# Patient Record
Sex: FEMALE | Race: WHITE | Employment: OTHER | URBAN - METROPOLITAN AREA
[De-identification: names, ages, dates, MRNs, and addresses within clinical notes are randomized per-mention and may not be internally consistent; named-entity substitution may affect disease eponyms.]

---

## 2017-02-19 RX ORDER — PRAVASTATIN SODIUM 20 MG
TABLET ORAL
Qty: 90 TABLET | Refills: 1 | Status: SHIPPED | OUTPATIENT
Start: 2017-02-19 | End: 2017-06-14

## 2017-02-19 RX ORDER — LEVOTHYROXINE SODIUM 0.07 MG/1
TABLET ORAL
Qty: 90 TABLET | Refills: 1 | Status: SHIPPED | OUTPATIENT
Start: 2017-02-19 | End: 2017-06-15

## 2017-02-19 RX ORDER — TOPIRAMATE 100 MG/1
TABLET, FILM COATED ORAL
Qty: 90 TABLET | Refills: 1 | Status: SHIPPED | OUTPATIENT
Start: 2017-02-19 | End: 2017-06-15

## 2017-05-25 PROBLEM — M85.80 OSTEOPENIA: Status: ACTIVE | Noted: 2017-05-25

## 2017-05-31 ENCOUNTER — TELEPHONE (OUTPATIENT)
Dept: FAMILY MEDICINE CLINIC | Facility: CLINIC | Age: 82
End: 2017-05-31

## 2017-05-31 NOTE — TELEPHONE ENCOUNTER
Dr Levy Roe, Please advise on request for labs to be done prior to Silas Reyes Annual appt on 6/2/17. Thank you.

## 2017-06-02 ENCOUNTER — OFFICE VISIT (OUTPATIENT)
Dept: INTERNAL MEDICINE CLINIC | Facility: CLINIC | Age: 82
End: 2017-06-02

## 2017-06-02 VITALS
BODY MASS INDEX: 23.39 KG/M2 | HEIGHT: 64 IN | HEART RATE: 73 BPM | DIASTOLIC BLOOD PRESSURE: 69 MMHG | SYSTOLIC BLOOD PRESSURE: 112 MMHG | RESPIRATION RATE: 16 BRPM | TEMPERATURE: 98 F | WEIGHT: 137 LBS

## 2017-06-02 DIAGNOSIS — E03.9 HYPOTHYROIDISM, UNSPECIFIED TYPE: ICD-10-CM

## 2017-06-02 DIAGNOSIS — H02.88B MEIBOMIAN GLAND DYSFUNCTION (MGD), BILATERAL, BOTH UPPER AND LOWER LIDS: ICD-10-CM

## 2017-06-02 DIAGNOSIS — M85.80 OSTEOPENIA WITH HIGH RISK OF FRACTURE: ICD-10-CM

## 2017-06-02 DIAGNOSIS — H04.123 DRY EYES, BILATERAL: ICD-10-CM

## 2017-06-02 DIAGNOSIS — N60.19 FIBROCYSTIC BREAST, UNSPECIFIED LATERALITY: ICD-10-CM

## 2017-06-02 DIAGNOSIS — H02.88A MEIBOMIAN GLAND DYSFUNCTION (MGD), BILATERAL, BOTH UPPER AND LOWER LIDS: ICD-10-CM

## 2017-06-02 DIAGNOSIS — G43.909 MIGRAINE WITHOUT STATUS MIGRAINOSUS, NOT INTRACTABLE, UNSPECIFIED MIGRAINE TYPE: ICD-10-CM

## 2017-06-02 DIAGNOSIS — E78.5 HYPERLIPIDEMIA, UNSPECIFIED HYPERLIPIDEMIA TYPE: ICD-10-CM

## 2017-06-02 DIAGNOSIS — Z00.00 ENCOUNTER FOR MEDICARE ANNUAL WELLNESS EXAM: Primary | ICD-10-CM

## 2017-06-02 DIAGNOSIS — M85.89 OSTEOPENIA OF MULTIPLE SITES: ICD-10-CM

## 2017-06-02 DIAGNOSIS — Z85.3 HISTORY OF RIGHT BREAST CANCER: ICD-10-CM

## 2017-06-02 PROCEDURE — G0463 HOSPITAL OUTPT CLINIC VISIT: HCPCS | Performed by: INTERNAL MEDICINE

## 2017-06-02 PROCEDURE — G0439 PPPS, SUBSEQ VISIT: HCPCS | Performed by: INTERNAL MEDICINE

## 2017-06-02 PROCEDURE — 96160 PT-FOCUSED HLTH RISK ASSMT: CPT | Performed by: INTERNAL MEDICINE

## 2017-06-02 PROCEDURE — 99213 OFFICE O/P EST LOW 20 MIN: CPT | Performed by: INTERNAL MEDICINE

## 2017-06-02 NOTE — PROGRESS NOTES
HPI:    Patient ID: Rima Rodriguez is a 80year old female.     HPI    Medicare annual exam and follow up of chronic medical problesm incuding but not limtied to  Hyperlipidemia     sympotms  :        Headache occasional migrain  dizziness        no mouth  daily Disp: 90 tablet Rfl: 1   PRAVASTATIN SODIUM 20 MG Oral Tab Take 1 tablet by mouth  nightly Disp: 90 tablet Rfl: 1   Ketoconazole 2 % External Shampoo Apply to scalp 2 times per week.  Disp: 120 mL Rfl: 6   Halcinonide (HALOG) 0.1 % External Cre respiratory distress. She has no wheezes. She has no rales. She exhibits no tenderness. Abdominal: Soft. Bowel sounds are normal. She exhibits no distension and no mass. There is no hepatosplenomegaly. There is no tenderness. No hernia.    Musculoskeletal URINE WBC      0 - 5 /HPF   7 (H)   URINE RBC      0 - 3 /HPF   <1   URINE BACTERIA      None seen   Negative   MICROSCOPIC URINALYSIS COMMENT         Completed   WBC      4.0 - 11.0 K/UL      RBC      3.70 - 5.40 M/UL      Hemoglobin      12.0 - 16.0 G/ Negative mg/dL  Negative   KETONES, URINE      Negative mg/dL  Trace (A)   BILIRUBIN, URINE, QUAL.       Negative  Negative   BLOOD, URINE      Negative UL  Negative   URINE NITRITE      Negative  Negative   UROBILINOGEN, URINE      <2.0 mg/dL  <2.0   URINE 1.6     PA LUMBAR SPINE (L1 - L4)       BMD:  0.972 gm/sq. cm.    T SCORE: -0.7      Z SCORE: 2.1  CONCLUSION: Moderate osteopenia left femoral neck. Normal bone density  total left hip and lumbar spine. 22% 10 year risk for major osteoporotic  fracture.  6 effect reviewed. Most recent laboratory and diagnostic testing reviewed. Dietary and lifestyle change discussed. Modification of risk for CAD discussed. Patient voiced understanding and agrees with current plan and management.     HPI:   Bishop Caballero lose urine?: 0-No    Do you have difficulty seeing?: 0-No    Do you have any difficulty walking or getting up?: 0-No    Do you have any tripping hazards?: 0-No    Are you on multiple medications?: 1-Yes    Does pain affect your day to day activities?:  0-No triamcinolone acetonide 0.025 % External Cream Apply topically 2 (two) times daily.  Disp:  Rfl:       MEDICAL INFORMATION:   Past Medical History   Diagnosis Date   • Migraine    • Hyperlipidemia           Past Surgical History    HX BREAST CANCER Right LDL Annually LDL CHOLESTEROL (mg/dL)   Date Value   04/23/2016 169*        EKG One Time completed    Colorectal Cancer Screening      Colonoscopy Screen every 10 years Colonoscopy,10 Years due on 04/16/1983 Update Health Maintenance if applicable    Fl Annually CREATININE (P) (mg/dL)   Date Value   04/23/2016 0.63    No flowsheet data found. BUN  Annually BUN (mg/dL)   Date Value   04/23/2016 11    No flowsheet data found.      Drug Serum Conc  Annually No results found for: DIGOXIN, DIG, VALP No flow 12/13/2016      Pneumococcal (Prevnar 13)                          04/22/2016            Meds This Visit:  No prescriptions requested or ordered in this encounter    Imaging & Referrals:  None        YV#[id number]

## 2017-06-05 ENCOUNTER — APPOINTMENT (OUTPATIENT)
Dept: LAB | Age: 82
End: 2017-06-05
Attending: INTERNAL MEDICINE
Payer: MEDICARE

## 2017-06-05 DIAGNOSIS — E78.5 HYPERLIPIDEMIA, UNSPECIFIED HYPERLIPIDEMIA TYPE: ICD-10-CM

## 2017-06-05 DIAGNOSIS — E03.9 HYPOTHYROIDISM, UNSPECIFIED TYPE: ICD-10-CM

## 2017-06-05 DIAGNOSIS — M85.89 OSTEOPENIA OF MULTIPLE SITES: ICD-10-CM

## 2017-06-05 PROCEDURE — 80061 LIPID PANEL: CPT

## 2017-06-05 PROCEDURE — 84443 ASSAY THYROID STIM HORMONE: CPT

## 2017-06-05 PROCEDURE — 83970 ASSAY OF PARATHORMONE: CPT

## 2017-06-05 PROCEDURE — 81001 URINALYSIS AUTO W/SCOPE: CPT

## 2017-06-05 PROCEDURE — 36415 COLL VENOUS BLD VENIPUNCTURE: CPT

## 2017-06-05 PROCEDURE — 84439 ASSAY OF FREE THYROXINE: CPT

## 2017-06-05 PROCEDURE — 80053 COMPREHEN METABOLIC PANEL: CPT

## 2017-06-05 PROCEDURE — 84080 ASSAY ALKALINE PHOSPHATASES: CPT

## 2017-06-05 PROCEDURE — 82306 VITAMIN D 25 HYDROXY: CPT

## 2017-06-05 PROCEDURE — 85027 COMPLETE CBC AUTOMATED: CPT

## 2017-06-09 ENCOUNTER — TELEPHONE (OUTPATIENT)
Dept: INTERNAL MEDICINE CLINIC | Facility: CLINIC | Age: 82
End: 2017-06-09

## 2017-06-09 DIAGNOSIS — R30.0 DYSURIA: Primary | ICD-10-CM

## 2017-06-14 NOTE — TELEPHONE ENCOUNTER
Spoke to pt, she is agreeable to UA and culture. She will go to the lab on Friday. Advised drinking adequate water every day. Pt voices understanding and agrees with plan.

## 2017-06-14 NOTE — TELEPHONE ENCOUNTER
Vit D is slightly low  urinalsyis slightly abnormal No RX no symptoms  Lipids elevated  Blood sugar liver kidney normal    CALL patient   Send her copy of result      Component      Latest Ref Rng 6/5/2017   Glucose      70-99 mg/dL 96   Sodium      136-14 140-400 K/   MEAN PLATELET VOLUME      7.4-10.3 fL 8.2   HDL Cholesterol       75   CHOLESTEROL, TOTAL      110-200 mg/dL 252 (H)   Triglycerides      1-149 mg/dL 83   NON HDL CHOL      <130 mg/dL 177 (H)   LDL Cholesterol Calc      0-99 mg/dL 160

## 2017-06-14 NOTE — TELEPHONE ENCOUNTER
Order Ua and culture  Drink adequate amount of water 64 oz per day  Will treat accordingly based on culture  If this is not ok with her let me know    I dont want to give her meds  Without testing    anitbiotic have potential side effect and she is nemesio

## 2017-06-16 ENCOUNTER — APPOINTMENT (OUTPATIENT)
Dept: LAB | Age: 82
End: 2017-06-16
Attending: INTERNAL MEDICINE
Payer: MEDICARE

## 2017-06-16 DIAGNOSIS — R30.0 DYSURIA: ICD-10-CM

## 2017-06-16 PROCEDURE — 87086 URINE CULTURE/COLONY COUNT: CPT

## 2017-06-16 PROCEDURE — 81001 URINALYSIS AUTO W/SCOPE: CPT

## 2017-06-16 RX ORDER — TOPIRAMATE 100 MG/1
TABLET, FILM COATED ORAL
Qty: 90 TABLET | Refills: 3 | Status: SHIPPED | OUTPATIENT
Start: 2017-06-16 | End: 2018-07-14

## 2017-06-16 RX ORDER — LEVOTHYROXINE SODIUM 0.07 MG/1
TABLET ORAL
Qty: 90 TABLET | Refills: 3 | Status: SHIPPED | OUTPATIENT
Start: 2017-06-16 | End: 2018-08-17 | Stop reason: DRUGHIGH

## 2017-07-27 ENCOUNTER — TELEPHONE (OUTPATIENT)
Dept: INTERNAL MEDICINE CLINIC | Facility: CLINIC | Age: 82
End: 2017-07-27

## 2017-07-27 NOTE — TELEPHONE ENCOUNTER
Actions Requested: appointment  Problem: dry cough and tightness in chest  Onset and Timing: 3 days  Associated Symptoms: Pt stts has dry cough and tightness in chest. Denies chest pain or shortness of breath. No fever or nasal congestion.  Pt stated a nurs or a weak immune system (e.g., HIV positive, cancer chemotherapy, organ transplant, splenectomy, chronic steroids)  * Fever > 100.5 F (38.1 C) and bedridden (e.g., nursing home patient, stroke, chronic illness, recovering from surgery)  * Increasing ankle

## 2017-07-28 ENCOUNTER — OFFICE VISIT (OUTPATIENT)
Dept: INTERNAL MEDICINE CLINIC | Facility: CLINIC | Age: 82
End: 2017-07-28

## 2017-07-28 VITALS
SYSTOLIC BLOOD PRESSURE: 108 MMHG | DIASTOLIC BLOOD PRESSURE: 70 MMHG | WEIGHT: 137 LBS | HEIGHT: 64 IN | TEMPERATURE: 98 F | HEART RATE: 81 BPM | BODY MASS INDEX: 23.39 KG/M2

## 2017-07-28 DIAGNOSIS — Z91.81 AT HIGH RISK FOR FALLS: ICD-10-CM

## 2017-07-28 DIAGNOSIS — J40 BRONCHITIS: Primary | ICD-10-CM

## 2017-07-28 DIAGNOSIS — R26.9 GAIT ABNORMALITY: ICD-10-CM

## 2017-07-28 PROCEDURE — 99214 OFFICE O/P EST MOD 30 MIN: CPT | Performed by: INTERNAL MEDICINE

## 2017-07-28 PROCEDURE — G0463 HOSPITAL OUTPT CLINIC VISIT: HCPCS | Performed by: INTERNAL MEDICINE

## 2017-07-28 RX ORDER — CLARITHROMYCIN 500 MG/1
500 TABLET, COATED ORAL 2 TIMES DAILY
Qty: 20 TABLET | Refills: 0 | Status: SHIPPED | OUTPATIENT
Start: 2017-07-28 | End: 2017-08-11

## 2017-07-28 RX ORDER — CODEINE PHOSPHATE AND GUAIFENESIN 10; 100 MG/5ML; MG/5ML
5 SOLUTION ORAL EVERY 6 HOURS PRN
Qty: 180 ML | Refills: 0 | Status: SHIPPED | OUTPATIENT
Start: 2017-07-28 | End: 2018-08-17 | Stop reason: ALTCHOICE

## 2017-07-28 NOTE — TELEPHONE ENCOUNTER
Advised patient of Dr. Umm Caba note. Patient verbalized understanding. Appointment booked for tomorrow at 1pm with Dr Iona Serrano at [facility].

## 2017-07-28 NOTE — PROGRESS NOTES
HPI:    Patient ID: Holli Kc is a 80year old female.     HPI    No fever   Has chills   Has discolored phlegm    Feels very tired   Not short of breath  Not wheezing   No leg swelling   No PND    Normally sleeps with 3 or 4 pillows   Denies shortness 650 g Rfl: 3   TraMADol HCl 50 MG Oral Tab TAKE 1 TABLET PO EVERY 4 TO 6 HOURS PRN FOR PAIN WITH FOOD Disp:  Rfl: 0   triamcinolone acetonide 0.025 % External Cream Apply topically 2 (two) times daily.  Disp:  Rfl:      Allergies:No Known Allergies    HISTO adenopathy. Neurological: She is alert. Skin: No rash noted. She is not diaphoretic. No erythema. Nursing note and vitals reviewed.            ASSESSMENT/PLAN:   (J40) Bronchitis  (primary encounter diagnosis)  Plan: XR CHEST PA + LAT CHEST (CPT=71020

## 2017-08-11 ENCOUNTER — TELEPHONE (OUTPATIENT)
Dept: INTERNAL MEDICINE CLINIC | Facility: CLINIC | Age: 82
End: 2017-08-11

## 2017-08-11 ENCOUNTER — OFFICE VISIT (OUTPATIENT)
Dept: INTERNAL MEDICINE CLINIC | Facility: CLINIC | Age: 82
End: 2017-08-11

## 2017-08-11 VITALS
HEIGHT: 64 IN | BODY MASS INDEX: 23.39 KG/M2 | TEMPERATURE: 98 F | SYSTOLIC BLOOD PRESSURE: 111 MMHG | DIASTOLIC BLOOD PRESSURE: 74 MMHG | WEIGHT: 137 LBS | HEART RATE: 86 BPM

## 2017-08-11 DIAGNOSIS — M72.2 PLANTAR FASCIITIS OF LEFT FOOT: Primary | ICD-10-CM

## 2017-08-11 PROCEDURE — G0463 HOSPITAL OUTPT CLINIC VISIT: HCPCS | Performed by: INTERNAL MEDICINE

## 2017-08-11 PROCEDURE — 99213 OFFICE O/P EST LOW 20 MIN: CPT | Performed by: INTERNAL MEDICINE

## 2017-08-11 RX ORDER — METHYLPREDNISOLONE 4 MG/1
TABLET ORAL
Qty: 1 KIT | Refills: 0 | Status: SHIPPED | OUTPATIENT
Start: 2017-08-11 | End: 2018-08-02 | Stop reason: ALTCHOICE

## 2017-08-11 NOTE — PROGRESS NOTES
Patient ID: Babak Kimbrough is a 80year old female. Patient presents with: Foot Pain: L foot pain x3 days       HISTORY OF PRESENT ILLNESS:   HPI  Patient presents for above. Having left sole pain for the past 2 days.   Having a difficult time standing History  Social History   Marital status:    Spouse name: N/A    Years of education: N/A  Number of children: N/A     Occupational History  None on file     Social History Main Topics   Smoking status: Never Smoker    Smokeless tobacco: Never Used

## 2017-08-11 NOTE — PATIENT INSTRUCTIONS
Plantar Fasciitis  Plantar fasciitis is a painful swelling of the plantar fascia. The plantar fascia is a thick, fibrous layer of tissue. It covers the bones on the bottom of your foot. And it supports the foot bones in an arched position.   This can happ · First thing in the morning and before sports, stretch the bottom of your feet. Gently flex your ankle so the toes move toward your knee. · Icing may help control heel pain. Apply an ice pack to the heel for 10-20 minutes as a preventive.  Or ice your gibran

## 2017-08-11 NOTE — TELEPHONE ENCOUNTER
Actions Requested: Appointment made for today at 2:20pm with Dr Darlene Rocha at [facility]  Problem: left foot pain  Onset and Timing: Since 8/9/17  Associated Symptoms: ankle discomfort  Aggravating by: walking  Alleviated by: not walking  Triage Note: patient sta

## 2017-08-18 ENCOUNTER — TELEPHONE (OUTPATIENT)
Dept: INTERNAL MEDICINE CLINIC | Facility: CLINIC | Age: 82
End: 2017-08-18

## 2017-08-18 NOTE — TELEPHONE ENCOUNTER
Patient reports that her insurance will not cover another DEXA screening in 2017 because one had been performed last year.  She is requesting the diagnosis/result from the one performed in 2016:    Notes Recorded by Joss Hurtado MD on 6/15/2016 at 1:22

## 2017-08-18 NOTE — TELEPHONE ENCOUNTER
Patient states her insurance will only cover a DEXA scan every 3 years. Last scan on 6/8/16, results below. Please advise if appeal is needed.

## 2017-08-24 ENCOUNTER — TELEPHONE (OUTPATIENT)
Dept: INTERNAL MEDICINE CLINIC | Facility: CLINIC | Age: 82
End: 2017-08-24

## 2017-08-24 RX ORDER — PRAVASTATIN SODIUM 20 MG
TABLET ORAL
Qty: 90 TABLET | Refills: 3 | Status: SHIPPED | OUTPATIENT
Start: 2017-08-24 | End: 2017-12-27

## 2017-08-25 ENCOUNTER — OFFICE VISIT (OUTPATIENT)
Dept: INTERNAL MEDICINE CLINIC | Facility: CLINIC | Age: 82
End: 2017-08-25

## 2017-08-25 VITALS
HEIGHT: 64 IN | SYSTOLIC BLOOD PRESSURE: 108 MMHG | BODY MASS INDEX: 23.39 KG/M2 | DIASTOLIC BLOOD PRESSURE: 72 MMHG | WEIGHT: 137 LBS | HEART RATE: 76 BPM | TEMPERATURE: 97 F

## 2017-08-25 DIAGNOSIS — M72.2 PLANTAR FASCIITIS OF LEFT FOOT: Primary | ICD-10-CM

## 2017-08-25 PROCEDURE — G0463 HOSPITAL OUTPT CLINIC VISIT: HCPCS | Performed by: INTERNAL MEDICINE

## 2017-08-25 PROCEDURE — 99213 OFFICE O/P EST LOW 20 MIN: CPT | Performed by: INTERNAL MEDICINE

## 2017-08-25 NOTE — PROGRESS NOTES
Patient ID: Elizabeth Ocampo is a 80year old female. Patient presents with: Follow - Up: L knee pain, steroids have helped       HISTORY OF PRESENT ILLNESS:   HPI  Patient presents for above. Here for follow-up of left foot plantar fasciitis.   Took her tablet, Rfl: 3    Allergies:No Known Allergies      Social History  Social History   Marital status:    Spouse name: N/A    Years of education: N/A  Number of children: N/A     Occupational History  None on file     Social History Main Topics   Hui

## 2017-10-16 ENCOUNTER — TELEPHONE (OUTPATIENT)
Dept: OPHTHALMOLOGY | Facility: CLINIC | Age: 82
End: 2017-10-16

## 2017-10-16 NOTE — TELEPHONE ENCOUNTER
Pt. having alot of dryness and burning and slight redness on the outside of the eye and not able to sleep at night. Pt.requesting to be seen sooner then 1st avail.

## 2017-10-19 ENCOUNTER — OFFICE VISIT (OUTPATIENT)
Dept: OPHTHALMOLOGY | Facility: CLINIC | Age: 82
End: 2017-10-19

## 2017-10-19 DIAGNOSIS — H25.12 AGE-RELATED NUCLEAR CATARACT OF LEFT EYE: ICD-10-CM

## 2017-10-19 DIAGNOSIS — H04.123 DRY EYES, BILATERAL: Primary | ICD-10-CM

## 2017-10-19 DIAGNOSIS — Z96.1 PSEUDOPHAKIA, RIGHT EYE: ICD-10-CM

## 2017-10-19 PROCEDURE — 92014 COMPRE OPH EXAM EST PT 1/>: CPT | Performed by: OPHTHALMOLOGY

## 2017-10-19 PROCEDURE — 92015 DETERMINE REFRACTIVE STATE: CPT | Performed by: OPHTHALMOLOGY

## 2017-10-19 NOTE — PROGRESS NOTES
Rima Rodriguez is a 80year old female. HPI:     HPI     Pt is here for a complete exam.  Pt is complaining of dry eyes OU  and states that her eyelids are itchy at night.   Pt is using Systane gtts  OU 3-4 times a day and is using Systane Gel gtts on he 650 g Rfl: 3   TraMADol HCl 50 MG Oral Tab TAKE 1 TABLET PO EVERY 4 TO 6 HOURS PRN FOR PAIN WITH FOOD Disp:  Rfl: 0   triamcinolone acetonide 0.025 % External Cream Apply topically 2 (two) times daily.  Disp:  Rfl:        Allergies:  No Known Allergies    R Type:  Progressive bifocal                 ASSESSMENT/PLAN:     Diagnoses and Plan:     Dry eyes, bilateral  Patient was instructed to use warm compresses to the eyelids twice a day everyday.     Instructions for warm compress use:   Patient should place wa

## 2017-10-19 NOTE — PATIENT INSTRUCTIONS
Dry eyes, bilateral  Patient was instructed to use warm compresses to the eyelids twice a day everyday. Instructions for warm compress use:   Patient should place wash compresses on both eyelids for 5 minutes every morning and every night.   After 5 jamari

## 2017-10-19 NOTE — ASSESSMENT & PLAN NOTE
Discussed diagnosis of cataracts in detail with patient. Cataract surgery could be considered in the left eye at this time due to decreased vision, but it would be patient's choice. Patient is not interested in surgery at this time.     Will continue to

## 2017-12-21 ENCOUNTER — TELEPHONE (OUTPATIENT)
Dept: INTERNAL MEDICINE CLINIC | Facility: CLINIC | Age: 82
End: 2017-12-21

## 2017-12-21 NOTE — TELEPHONE ENCOUNTER
Pt is calling to request orders for a cholesterol test. pls advise . Thank you  Pt is requesting a call back when approved.

## 2017-12-27 ENCOUNTER — APPOINTMENT (OUTPATIENT)
Dept: LAB | Age: 82
End: 2017-12-27
Attending: INTERNAL MEDICINE
Payer: MEDICARE

## 2017-12-27 DIAGNOSIS — E78.5 HYPERLIPIDEMIA, UNSPECIFIED HYPERLIPIDEMIA TYPE: ICD-10-CM

## 2017-12-27 PROCEDURE — 80061 LIPID PANEL: CPT

## 2017-12-27 PROCEDURE — 36415 COLL VENOUS BLD VENIPUNCTURE: CPT

## 2017-12-28 RX ORDER — KETOCONAZOLE 20 MG/ML
SHAMPOO TOPICAL
Qty: 120 ML | Refills: 2 | Status: SHIPPED | OUTPATIENT
Start: 2017-12-28 | End: 2018-08-17 | Stop reason: ALTCHOICE

## 2018-01-11 ENCOUNTER — TELEPHONE (OUTPATIENT)
Dept: INTERNAL MEDICINE CLINIC | Facility: CLINIC | Age: 83
End: 2018-01-11

## 2018-06-25 ENCOUNTER — TELEPHONE (OUTPATIENT)
Dept: INTERNAL MEDICINE CLINIC | Facility: CLINIC | Age: 83
End: 2018-06-25

## 2018-06-29 ENCOUNTER — APPOINTMENT (OUTPATIENT)
Dept: LAB | Age: 83
End: 2018-06-29
Attending: INTERNAL MEDICINE
Payer: MEDICARE

## 2018-06-29 DIAGNOSIS — E03.9 HYPOTHYROIDISM, UNSPECIFIED TYPE: ICD-10-CM

## 2018-06-29 DIAGNOSIS — E78.5 HYPERLIPIDEMIA, UNSPECIFIED HYPERLIPIDEMIA TYPE: ICD-10-CM

## 2018-06-29 LAB
ALBUMIN SERPL BCP-MCNC: 3.6 G/DL (ref 3.5–4.8)
ALBUMIN/GLOB SERPL: 1.2 {RATIO} (ref 1–2)
ALP SERPL-CCNC: 80 U/L (ref 32–100)
ALT SERPL-CCNC: 16 U/L (ref 14–54)
ANION GAP SERPL CALC-SCNC: 8 MMOL/L (ref 0–18)
AST SERPL-CCNC: 19 U/L (ref 15–41)
BILIRUB SERPL-MCNC: 0.6 MG/DL (ref 0.3–1.2)
BUN SERPL-MCNC: 16 MG/DL (ref 8–20)
BUN/CREAT SERPL: 22.9 (ref 10–20)
CALCIUM SERPL-MCNC: 8.9 MG/DL (ref 8.5–10.5)
CHLORIDE SERPL-SCNC: 107 MMOL/L (ref 95–110)
CHOLEST SERPL-MCNC: 223 MG/DL (ref 110–200)
CO2 SERPL-SCNC: 24 MMOL/L (ref 22–32)
CREAT SERPL-MCNC: 0.7 MG/DL (ref 0.5–1.5)
ERYTHROCYTE [DISTWIDTH] IN BLOOD BY AUTOMATED COUNT: 13.8 % (ref 11–15)
GLOBULIN PLAS-MCNC: 2.9 G/DL (ref 2.5–3.7)
GLUCOSE SERPL-MCNC: 90 MG/DL (ref 70–99)
HCT VFR BLD AUTO: 39.6 % (ref 35–48)
HDLC SERPL-MCNC: 66 MG/DL
HGB BLD-MCNC: 13.3 G/DL (ref 12–16)
LDLC SERPL CALC-MCNC: 137 MG/DL (ref 0–99)
MCH RBC QN AUTO: 29.5 PG (ref 27–32)
MCHC RBC AUTO-ENTMCNC: 33.5 G/DL (ref 32–37)
MCV RBC AUTO: 88 FL (ref 80–100)
NONHDLC SERPL-MCNC: 157 MG/DL
OSMOLALITY UR CALC.SUM OF ELEC: 289 MOSM/KG (ref 275–295)
PATIENT FASTING: YES
PLATELET # BLD AUTO: 182 K/UL (ref 140–400)
PMV BLD AUTO: 8.3 FL (ref 7.4–10.3)
POTASSIUM SERPL-SCNC: 3.9 MMOL/L (ref 3.3–5.1)
PROT SERPL-MCNC: 6.5 G/DL (ref 5.9–8.4)
RBC # BLD AUTO: 4.5 M/UL (ref 3.7–5.4)
SODIUM SERPL-SCNC: 139 MMOL/L (ref 136–144)
T4 FREE SERPL-MCNC: 1.28 NG/DL (ref 0.58–1.64)
TRIGL SERPL-MCNC: 102 MG/DL (ref 1–149)
TSH SERPL-ACNC: 0.25 UIU/ML (ref 0.45–5.33)
WBC # BLD AUTO: 3.4 K/UL (ref 4–11)

## 2018-06-29 PROCEDURE — 80061 LIPID PANEL: CPT

## 2018-06-29 PROCEDURE — 80053 COMPREHEN METABOLIC PANEL: CPT

## 2018-06-29 PROCEDURE — 84443 ASSAY THYROID STIM HORMONE: CPT

## 2018-06-29 PROCEDURE — 85027 COMPLETE CBC AUTOMATED: CPT

## 2018-06-29 PROCEDURE — 36415 COLL VENOUS BLD VENIPUNCTURE: CPT

## 2018-06-29 PROCEDURE — 84439 ASSAY OF FREE THYROXINE: CPT

## 2018-07-16 RX ORDER — TOPIRAMATE 100 MG/1
TABLET, FILM COATED ORAL
Qty: 90 TABLET | Refills: 0 | Status: SHIPPED | OUTPATIENT
Start: 2018-07-16 | End: 2018-09-03

## 2018-08-02 ENCOUNTER — OFFICE VISIT (OUTPATIENT)
Dept: NEUROLOGY | Facility: CLINIC | Age: 83
End: 2018-08-02
Payer: COMMERCIAL

## 2018-08-02 ENCOUNTER — HOSPITAL ENCOUNTER (OUTPATIENT)
Dept: GENERAL RADIOLOGY | Facility: HOSPITAL | Age: 83
Discharge: HOME | End: 2018-08-02
Attending: Other
Payer: MEDICARE

## 2018-08-02 ENCOUNTER — TELEPHONE (OUTPATIENT)
Dept: NEUROLOGY | Facility: CLINIC | Age: 83
End: 2018-08-02

## 2018-08-02 VITALS
DIASTOLIC BLOOD PRESSURE: 64 MMHG | BODY MASS INDEX: 23.39 KG/M2 | WEIGHT: 137 LBS | HEART RATE: 82 BPM | HEIGHT: 64 IN | SYSTOLIC BLOOD PRESSURE: 110 MMHG

## 2018-08-02 DIAGNOSIS — M79.601 RIGHT ARM PAIN: Primary | ICD-10-CM

## 2018-08-02 DIAGNOSIS — M79.601 RIGHT ARM PAIN: ICD-10-CM

## 2018-08-02 PROCEDURE — 99204 OFFICE O/P NEW MOD 45 MIN: CPT | Performed by: OTHER

## 2018-08-02 PROCEDURE — 72050 X-RAY EXAM NECK SPINE 4/5VWS: CPT | Performed by: OTHER

## 2018-08-02 RX ORDER — GABAPENTIN 100 MG/1
CAPSULE ORAL
Qty: 90 CAPSULE | Refills: 1 | Status: SHIPPED | OUTPATIENT
Start: 2018-08-02 | End: 2019-06-14

## 2018-08-02 RX ORDER — TRAMADOL HYDROCHLORIDE 50 MG/1
50 TABLET ORAL EVERY 6 HOURS PRN
Qty: 25 TABLET | Refills: 0 | Status: SHIPPED | OUTPATIENT
Start: 2018-08-02 | End: 2019-06-14

## 2018-08-02 NOTE — PROGRESS NOTES
Neurology Outpatient Consult Note    Nasrin Ferrer : 1933   Referring Physician: Dr. Vishal Paris   HPI:     Nasrin Ferrer is a 80year old female who is being seen in neurologic evaluation. Patient being seen in evaluation for right arm pain.   She Surgical History:  2014: CATARACT EXTRACTION W/  INTRAOCULAR LENS IMPLA* Right      Comment: [facility] Kelin Small [facility]  No date: HX BREAST CANCER Right   Family History   Problem Relation Age of Onset   • Asthma Father    • Cancer Mother    • Diabetes and reactive to light; extraocular movements intact; facial sensation intact V1-3, face symmetric, hearing intact, no dysarthria or dysphonia  Motor: 5/5 strength proximally and distally with the exception of minimal weakness in thumb opposition on the rig

## 2018-08-03 NOTE — TELEPHONE ENCOUNTER
If we can please call pt and advise she did have arthritic changes in her cervical spine at multiple levels on X-ray, most pronounced at C5/6, as expected. This can certainly be causing her right arm symptoms.   She may benefit from course of PT, will plac

## 2018-08-03 NOTE — TELEPHONE ENCOUNTER
Spoke to patient and notified her of below. She was understanding and would like to move forward with PT. Gave patient phone number to [facility] at [facility]. She will call and schedule.

## 2018-08-07 ENCOUNTER — TELEPHONE (OUTPATIENT)
Dept: PHYSICAL THERAPY | Age: 83
End: 2018-08-07

## 2018-08-17 ENCOUNTER — OFFICE VISIT (OUTPATIENT)
Dept: INTERNAL MEDICINE CLINIC | Facility: CLINIC | Age: 83
End: 2018-08-17
Payer: COMMERCIAL

## 2018-08-17 VITALS
SYSTOLIC BLOOD PRESSURE: 112 MMHG | HEART RATE: 71 BPM | BODY MASS INDEX: 23.21 KG/M2 | HEIGHT: 63 IN | DIASTOLIC BLOOD PRESSURE: 73 MMHG | TEMPERATURE: 97 F | WEIGHT: 131 LBS

## 2018-08-17 DIAGNOSIS — M79.10 MYALGIA: ICD-10-CM

## 2018-08-17 DIAGNOSIS — Z85.3 HISTORY OF RIGHT BREAST CANCER: ICD-10-CM

## 2018-08-17 DIAGNOSIS — Z91.81 AT HIGH RISK FOR FALLS: ICD-10-CM

## 2018-08-17 DIAGNOSIS — Z00.00 ENCOUNTER FOR ANNUAL HEALTH EXAMINATION: ICD-10-CM

## 2018-08-17 DIAGNOSIS — E55.9 VITAMIN D DEFICIENCY: ICD-10-CM

## 2018-08-17 DIAGNOSIS — E78.5 HYPERLIPIDEMIA, UNSPECIFIED HYPERLIPIDEMIA TYPE: ICD-10-CM

## 2018-08-17 DIAGNOSIS — Z00.00 ENCOUNTER FOR MEDICARE ANNUAL WELLNESS EXAM: Primary | ICD-10-CM

## 2018-08-17 DIAGNOSIS — E03.9 HYPOTHYROIDISM, UNSPECIFIED TYPE: ICD-10-CM

## 2018-08-17 PROCEDURE — 96160 PT-FOCUSED HLTH RISK ASSMT: CPT | Performed by: INTERNAL MEDICINE

## 2018-08-17 PROCEDURE — 99397 PER PM REEVAL EST PAT 65+ YR: CPT | Performed by: INTERNAL MEDICINE

## 2018-08-17 PROCEDURE — G0009 ADMIN PNEUMOCOCCAL VACCINE: HCPCS | Performed by: INTERNAL MEDICINE

## 2018-08-17 PROCEDURE — 90732 PPSV23 VACC 2 YRS+ SUBQ/IM: CPT | Performed by: INTERNAL MEDICINE

## 2018-08-17 PROCEDURE — G0439 PPPS, SUBSEQ VISIT: HCPCS | Performed by: INTERNAL MEDICINE

## 2018-08-18 ENCOUNTER — APPOINTMENT (OUTPATIENT)
Dept: LAB | Age: 83
End: 2018-08-18
Attending: INTERNAL MEDICINE
Payer: MEDICARE

## 2018-08-18 DIAGNOSIS — E03.9 HYPOTHYROIDISM, UNSPECIFIED TYPE: ICD-10-CM

## 2018-08-18 DIAGNOSIS — E55.9 VITAMIN D DEFICIENCY: ICD-10-CM

## 2018-08-18 DIAGNOSIS — M79.10 MYALGIA: ICD-10-CM

## 2018-08-18 LAB
CK SERPL-CCNC: 91 U/L (ref 38–234)
TSH SERPL-ACNC: 4.54 UIU/ML (ref 0.45–5.33)

## 2018-08-18 PROCEDURE — 82306 VITAMIN D 25 HYDROXY: CPT

## 2018-08-18 PROCEDURE — 36415 COLL VENOUS BLD VENIPUNCTURE: CPT

## 2018-08-18 PROCEDURE — 84443 ASSAY THYROID STIM HORMONE: CPT

## 2018-08-18 PROCEDURE — 82550 ASSAY OF CK (CPK): CPT

## 2018-08-20 LAB — 25(OH)D3 SERPL-MCNC: 26 NG/ML

## 2018-08-20 RX ORDER — LEVOTHYROXINE SODIUM 0.05 MG/1
TABLET ORAL
Qty: 90 TABLET | Refills: 3 | Status: SHIPPED | OUTPATIENT
Start: 2018-08-20 | End: 2019-06-20

## 2018-08-22 ENCOUNTER — TELEPHONE (OUTPATIENT)
Dept: INTERNAL MEDICINE CLINIC | Facility: CLINIC | Age: 83
End: 2018-08-22

## 2018-08-22 ENCOUNTER — OFFICE VISIT (OUTPATIENT)
Dept: PHYSICAL THERAPY | Age: 83
End: 2018-08-22
Attending: Other
Payer: MEDICARE

## 2018-08-22 DIAGNOSIS — M79.601 RIGHT ARM PAIN: ICD-10-CM

## 2018-08-22 PROCEDURE — 97162 PT EVAL MOD COMPLEX 30 MIN: CPT

## 2018-08-22 PROCEDURE — 97530 THERAPEUTIC ACTIVITIES: CPT

## 2018-08-22 NOTE — PROGRESS NOTES
SPINE EVALUATION:   Referring Physician: Dr. Jude Mckee  Date of Onset: 8/3/2018 Date of Service: 8/22/2018   Diagnosis: Right arm pain (M79.601);  CERVICAL SPINAL STENOSIS WITH RADICULOPATHY  PATIENT SUMMARY:   Zehra Flowers is a 80year old y/o female wh History of Trauma No    Night Pain, Unexplained Weight Loss, Fever or Chills Yes Ankle pain   Lower Extremity Neurological Deficit no    Vision Changes/Double Vision no    Headaches yes Hx of migraines   Chest Pain or Palpitations, SOB no    Dizziness, w (+/-)   Flexion 50                        Extension 32    R Sidebend 18    L Sidebend 22    R Rotation 46    L Rotation 60    Protrusion Min loss    Retraction Mod loss      Cervical Repeated Motion Testing: Not tested    Shoulder AROM: measured in degrees CARE:    Goals:    1. Patient will be independent with HEP to optimize gains made in PT to home and community settings and for self management of prophylactic care.   2. Patient will have cervical R rotation ROM to at least 60 deg to facilitate good body me

## 2018-08-24 ENCOUNTER — TELEPHONE (OUTPATIENT)
Dept: FAMILY MEDICINE CLINIC | Facility: CLINIC | Age: 83
End: 2018-08-24

## 2018-08-24 NOTE — TELEPHONE ENCOUNTER
Vit D slightly low  ERX sent to pharmacy for   Normal otherwise      Component      Latest Ref Rng & Units 8/18/2018   TSH      0.45 - 5.33 uIU/mL 4.54   CK      38 - 234 U/L 91   Vitamin D, 25OH, Total      ng/mL 26.0

## 2018-08-24 NOTE — TELEPHONE ENCOUNTER
Patient informed of results (identified name and ) and recommendations, as per provider's response below.  Patient voiced understanding and agrees with Rx.

## 2018-08-24 NOTE — TELEPHONE ENCOUNTER
Called patient for clarification of \"px\" and she wanted after-visit summary. Lab results and after-visit summary mailed to patient.  She also asked about physical exam billed as she thought the recent OV was an annual physical exam; gave her phone # of bi

## 2018-08-28 ENCOUNTER — OFFICE VISIT (OUTPATIENT)
Dept: PHYSICAL THERAPY | Age: 83
End: 2018-08-28
Attending: Other
Payer: MEDICARE

## 2018-08-28 PROCEDURE — 97140 MANUAL THERAPY 1/> REGIONS: CPT

## 2018-08-28 PROCEDURE — 97110 THERAPEUTIC EXERCISES: CPT

## 2018-08-28 NOTE — PROGRESS NOTES
Dx:       Right arm pain (M79.601);  CERVICAL SPINAL STENOSIS WITH RADICULOPATHY     Authorized # of Visits:  0/72 (Medicare; Cert ends - 91/35/4625)        Next MD visit: none scheduled  Fall Risk: standard         Precautions: n/a           Medication Stacey strengthening.     Charges: TEx2, MTx1      Total Timed Treatment: 45 min  Total Treatment Time: 45 min

## 2018-08-30 ENCOUNTER — OFFICE VISIT (OUTPATIENT)
Dept: PHYSICAL THERAPY | Age: 83
End: 2018-08-30
Attending: INTERNAL MEDICINE
Payer: MEDICARE

## 2018-08-30 PROCEDURE — 97110 THERAPEUTIC EXERCISES: CPT

## 2018-08-30 PROCEDURE — 97140 MANUAL THERAPY 1/> REGIONS: CPT

## 2018-08-30 NOTE — PROGRESS NOTES
HPI:   Pablo Schroeder is a 80year old female who presents for a Medicare Subsequent Annual Wellness visit (Pt already had Initial Annual Wellness).       Her last annual assessment has been over 1 year: Annual Physical due on 06/02/2018       /73   P we do not have it in [facility], and patient is instructed to get our office a copy of it for scanning into Epic.            She has never smoked tobacco.    CAGE Alcohol screening   Hayden Bey was screened for Alcohol abuse and had a score of 0 so is at low acetonide 0.025 % External Cream Apply topically 2 (two) times daily. MEDICAL INFORMATION:   She  has a past medical history of Age-related nuclear cataract of left eye (10/19/2017); Dry eyes, bilateral (8/11/2016);  History of right breast cancer (4/2 calculated from the following:    Height as of this encounter: 5' 3\" (1.6 m). Weight as of this encounter: 131 lb (59.4 kg).     Medicare Hearing Assessment  (Required for AWV/SWV)    Hearing Screening    Screening Method:  Questionnaire  I have a probl Conjunctivae and EOM are normal. Pupils are equal, round, and reactive to light. Right eye exhibits no discharge. Left eye exhibits no discharge. No scleral icterus.    Cardiovascular: Normal rate, regular rhythm, normal heart sounds and intact distal pulse understanding  and agrees with plan      Diet assessment: good     PLAN:  The patient indicates understanding of these issues and agrees to the plan. Reinforced healthy diet, lifestyle, and exercise. No Follow-up on file.      Bhavani Myers MD, 8/29/2 DENSITOMETRY (CPT=77080) 06/08/2016    No flowsheet data found. Pap and Pelvic      Pap: Every 3 yrs age 21-68 or Pap+HPV every 5 yrs age 33-67, age 72 and older at high risk There are no preventive care reminders to display for this patient.  Update Hea found.    Creatinine  Annually Creatinine (mg/dL)   Date Value   06/29/2018 0.70     CREATININE (P) (mg/dL)   Date Value   04/23/2016 0.63    No flowsheet data found.     Drug Serum Conc  Annually No results found for: DIGOXIN, DIG, VALP No flowsheet data f

## 2018-08-30 NOTE — PROGRESS NOTES
Dx:       Right arm pain (M79.601);  CERVICAL SPINAL STENOSIS WITH RADICULOPATHY     Authorized # of Visits:  1/87 (Medicare; Cert ends - 17/42/3060)        Next MD visit: none scheduled  Fall Risk: standard         Precautions: n/a           Medication Stacey good body mechanics for turning head when driving. 3. Patient will have at least 4/5 cervical and parascapular strength to lift and transfer grocery bags without difficulty.   4. Patient will have at least 150 R shoulder flexion AROM to facilitate better e

## 2018-08-30 NOTE — PATIENT INSTRUCTIONS
Margie Strange's SCREENING SCHEDULE   Tests on this list are recommended by your physician but may not be covered, or covered at this frequency, by your insurer. Please check with your insurance carrier before scheduling to verify coverage.    PREVENTATIV with a family history    Colorectal Cancer Screening  Covered up to Age 76     Colonoscopy Screen   Covered every 10 years- more often if abnormal There are no preventive care reminders to display for this patient.  Update Health Maintenance if applicable 12/13/16  -FLU VACC PRSV FREE INC ANTIG    Please get every year    Pneumococcal 13 (Prevnar)  Covered Once after 65   Orders placed or performed in visit on 04/22/16  -PNEUMOCOCCAL VACC, 13 ARMANDO IM    Please get once after your 65th birthday    Vesta Ahumada

## 2018-09-04 ENCOUNTER — OFFICE VISIT (OUTPATIENT)
Dept: PHYSICAL THERAPY | Age: 83
End: 2018-09-04
Attending: INTERNAL MEDICINE
Payer: MEDICARE

## 2018-09-04 PROCEDURE — 97140 MANUAL THERAPY 1/> REGIONS: CPT

## 2018-09-04 PROCEDURE — 97110 THERAPEUTIC EXERCISES: CPT

## 2018-09-04 RX ORDER — TOPIRAMATE 100 MG/1
TABLET, FILM COATED ORAL
Qty: 90 TABLET | Refills: 0 | Status: SHIPPED | OUTPATIENT
Start: 2018-09-04 | End: 2018-11-22

## 2018-09-04 NOTE — TELEPHONE ENCOUNTER
Refill Protocol Appointment Criteria  · Appointment scheduled in the past 6 months or in the next 3 months  Recent Outpatient Visits            5 days ago     [address], [place]    Office Visit    1 w

## 2018-09-04 NOTE — PROGRESS NOTES
Dx:       Right arm pain (M79.601);  CERVICAL SPINAL STENOSIS WITH RADICULOPATHY     Authorized # of Visits:  4/55 (Medicare; Cert ends - 22/73/3866)        Next MD visit: none scheduled  Fall Risk: standard         Precautions: n/a           Medication Stacey and R UE (posterior aspect)  - Supine: Shoulder PROM, B all dir   Neuro Re-ed - Supine: DCF contractions - Supine: DCF contractions - Supine: DCF contractions   Therapeutic Activity - posture re-ed with lumbar roll -    DCF=Deep cervical flexor      Assess

## 2018-09-06 ENCOUNTER — TELEPHONE (OUTPATIENT)
Dept: PHYSICAL THERAPY | Age: 83
End: 2018-09-06

## 2018-09-06 ENCOUNTER — APPOINTMENT (OUTPATIENT)
Dept: PHYSICAL THERAPY | Age: 83
End: 2018-09-06
Payer: MEDICARE

## 2018-09-11 ENCOUNTER — OFFICE VISIT (OUTPATIENT)
Dept: PHYSICAL THERAPY | Age: 83
End: 2018-09-11
Attending: Other
Payer: MEDICARE

## 2018-09-11 PROCEDURE — 97110 THERAPEUTIC EXERCISES: CPT

## 2018-09-11 PROCEDURE — 97140 MANUAL THERAPY 1/> REGIONS: CPT

## 2018-09-11 NOTE — PROGRESS NOTES
Dx:       Right arm pain (M79.601);  CERVICAL SPINAL STENOSIS WITH RADICULOPATHY     Authorized # of Visits:  6/74 (Medicare; Cert ends - 69/29/7492)        Next MD visit: 10/2/2018  Fall Risk: standard         Precautions: n/a           Medication Changes yellow tband   - Sitting: Shoulder IR/ER; 10x1  - supine; alt shoulder flex; 10x1  - supine: reverse flies; 10x10  - Supine: serratus rope; 10x2  - sitting: cervical rot B: 10x1   - standing: scapular retraction; 10x2 (yellow tband)  - standing: shoulder e at least 4/5 cervical and parascapular strength to lift and transfer grocery bags without difficulty. 4. Patient will have at least 150 R shoulder flexion AROM to facilitate better ease with reaching for items on a shelf overhead.   9/11/2018: reviewed goaniyah

## 2018-09-13 ENCOUNTER — APPOINTMENT (OUTPATIENT)
Dept: PHYSICAL THERAPY | Age: 83
End: 2018-09-13
Payer: MEDICARE

## 2018-09-18 ENCOUNTER — OFFICE VISIT (OUTPATIENT)
Dept: PHYSICAL THERAPY | Age: 83
End: 2018-09-18
Attending: Other
Payer: MEDICARE

## 2018-09-18 PROCEDURE — 97110 THERAPEUTIC EXERCISES: CPT

## 2018-09-18 PROCEDURE — 97140 MANUAL THERAPY 1/> REGIONS: CPT

## 2018-09-18 NOTE — PROGRESS NOTES
Dx:       Right arm pain (M79.601);  CERVICAL SPINAL STENOSIS WITH RADICULOPATHY     Authorized # of Visits:  8/04 (Medicare; Cert ends - 10/18/4251)        Next MD visit: 10/2/2018  Fall Risk: standard         Precautions: n/a           Medication Changes Sidebend (C3) R=5, L=5  R=5, L=5   Upper trap (C4) R= 3 -, L = 3 -  R = 4, L =4   Shoulder Abd (C5) R=5, L=5 R =5, L =5    Biceps (C6) R=5, L=5 R=5, L =5     Wrist ext (C6) R=5, L=5  ECRL and ECRB were both tested ECRL: R=5, L =5  ECRB: R=5, L =5  ECU: R = supine; alt shoulder flex; 10x1  - supine: reverse flies; 10x1  - sitting: cervical rot B: 10x1   - sitting: scapular retraction; 10x1  - Supine: serratus rope; 10x1 - Sitting: Shoulder IR/ER; 10x1  - supine; alt shoulder flex; 10x1  - supine: reverse flie dir   Neuro Re-ed - Supine: DCF contractions - Supine: DCF contractions - Supine: DCF contractions - Supine: DCF contractions  - Supine:  Chin tucks; 10x5\" holds - Supine:  Chin tucks; 10x5\" holds   Therapeutic Activity - posture re-ed with lumbar roll -

## 2018-09-20 ENCOUNTER — OFFICE VISIT (OUTPATIENT)
Dept: PHYSICAL THERAPY | Age: 83
End: 2018-09-20
Attending: INTERNAL MEDICINE
Payer: MEDICARE

## 2018-09-20 PROCEDURE — 97140 MANUAL THERAPY 1/> REGIONS: CPT

## 2018-09-20 PROCEDURE — 97110 THERAPEUTIC EXERCISES: CPT

## 2018-09-20 NOTE — PROGRESS NOTES
Dx:       Right arm pain (M79.601);  CERVICAL SPINAL STENOSIS WITH RADICULOPATHY     Authorized # of Visits:  6/29 (Medicare; Cert ends - 31/78/3926)        Next MD visit: 10/2/2018  Fall Risk: standard         Precautions: n/a           Medication Changes green tube  - standing: shoulder ext; 10x2 with green tband  - standing:  Wall wash circles (CW & CCW): 10x2 each dir, B   Manual Therapy - Supine: Cervical and CT junction joint mob: sideglides and A/P grades I-II  - Supine: MFR at cervical paraspinals an lift and transfer grocery bags without difficulty. 4. Patient will have at least 150 R shoulder flexion AROM to facilitate better ease with reaching for items on a shelf overhead. 9/11/2018: reviewed goals with pt. Pt in agreement.     Plan: Continue with

## 2018-09-25 ENCOUNTER — OFFICE VISIT (OUTPATIENT)
Dept: PHYSICAL THERAPY | Age: 83
End: 2018-09-25
Attending: INTERNAL MEDICINE
Payer: MEDICARE

## 2018-09-25 PROCEDURE — 97110 THERAPEUTIC EXERCISES: CPT

## 2018-09-25 PROCEDURE — 97530 THERAPEUTIC ACTIVITIES: CPT

## 2018-09-25 NOTE — PROGRESS NOTES
Patient Name: Fermín Ignacio  YOB: 1933          MRN number:  [id number]  Date:  9/25/2018  Referring Physician:  Dr. Cassandra Trimble MD  Dx:       Right arm pain (M79.601);  CERVICAL SPINAL STENOSIS WITH RADICULOPATHY     Authorized # of Visits:  8/1 continue with her HEP as it has helped with her neck flexibility.     Objective:     ROM:  Wrist Ext: R= 50 deg, L = 50 deg  Wrist Flex: R= 60 deg, L = 55 deg    Elbow Ext: R= + 8 deg hyperext, L= + 2 deg hyperext  Elbow Flex: R= 145 deg, L = 145 deg      C Tests: 1. Ulnar nerve: R = (-), L = (-)  2. Radial nerve: R = (+), but pt states it does not produce her symptoms L = (+)  3.  Median nerve: R = (+), but pt states it does not produce her symptoms L = (-)     Palpation: TTP at R ECRB     Neuro Screen: B U signed by therapist: Chris Witt, PT, DPT, COMT, Cert.  MDT    Physician's certification required: No     ------------------------------------------------------    Treatment rendered   Date: 9/11/2018  Tx#: 5/18 Date: 9/18/2018  Tx#: 6/18 Date: 9/20/2 grades I-II  - Supine: MFR at cervical paraspinals and R UE (posterior aspect)  - Supine: Shoulder PROM, R all dir -   Neuro Re-ed - Supine: DCF contractions  - Supine:  Chin tucks; 10x5\" holds - Supine:  Chin tucks; 10x5\" holds - Supine:  Chin tucks; 10

## 2018-10-02 ENCOUNTER — OFFICE VISIT (OUTPATIENT)
Dept: NEUROLOGY | Facility: CLINIC | Age: 83
End: 2018-10-02
Payer: COMMERCIAL

## 2018-10-02 ENCOUNTER — TELEPHONE (OUTPATIENT)
Dept: NEUROLOGY | Facility: CLINIC | Age: 83
End: 2018-10-02

## 2018-10-02 VITALS
BODY MASS INDEX: 23.21 KG/M2 | RESPIRATION RATE: 15 BRPM | SYSTOLIC BLOOD PRESSURE: 118 MMHG | WEIGHT: 131 LBS | DIASTOLIC BLOOD PRESSURE: 68 MMHG | HEART RATE: 78 BPM | HEIGHT: 63 IN

## 2018-10-02 DIAGNOSIS — M54.12 CERVICAL RADICULOPATHY: Primary | ICD-10-CM

## 2018-10-02 PROCEDURE — 99213 OFFICE O/P EST LOW 20 MIN: CPT | Performed by: OTHER

## 2018-10-02 NOTE — PROGRESS NOTES
Neurology Outpatient Progress Note    Elizabeth Ocampo : 1933   HPI:     Elizabeth Ocampo is a 80year old female who is being seen in follow-up. I saw her last in August of this year.   She has gone through a course of physical therapy and did not fee Asthma Father    • Cancer Mother    • Diabetes Other         daughter   • Glaucoma Neg    • Macular degeneration Neg       Social History:  Social History    Socioeconomic History      Marital status:        Spouse name: Not on file      Number of ch palpitations  GI: see HPI  MUSCULOSKELETAL: see HPI  PSYCH: no depression, no anxiety  NEURO: see HPI    EXAM:     Vitals:  /68 (BP Location: Left arm, Patient Position: Sitting, Cuff Size: adult)   Pulse 78   Resp 15   Ht 63\"   Wt 131 lb   BMI 23. 2

## 2018-10-02 NOTE — TELEPHONE ENCOUNTER
[facility]>As part of our Prior Authorization Reduction program, [employer] no longer requires prior authorization for CT, MRI/MRA and transthoracic echocardiography procedures for Medicare members effective 1/1/2018  Case # [id number]

## 2018-10-08 RX ORDER — PRAVASTATIN SODIUM 40 MG
TABLET ORAL
Qty: 90 TABLET | Refills: 3 | Status: SHIPPED | OUTPATIENT
Start: 2018-10-08

## 2018-10-12 ENCOUNTER — HOSPITAL ENCOUNTER (OUTPATIENT)
Dept: MRI IMAGING | Facility: HOSPITAL | Age: 83
Discharge: HOME | End: 2018-10-12
Attending: Other
Payer: MEDICARE

## 2018-10-12 DIAGNOSIS — M54.12 CERVICAL RADICULOPATHY: ICD-10-CM

## 2018-10-12 PROCEDURE — 72141 MRI NECK SPINE W/O DYE: CPT | Performed by: OTHER

## 2018-10-16 ENCOUNTER — OFFICE VISIT (OUTPATIENT)
Dept: NEUROLOGY | Facility: CLINIC | Age: 83
End: 2018-10-16
Payer: COMMERCIAL

## 2018-10-16 VITALS
RESPIRATION RATE: 16 BRPM | HEIGHT: 63 IN | WEIGHT: 131 LBS | HEART RATE: 84 BPM | BODY MASS INDEX: 23.21 KG/M2 | DIASTOLIC BLOOD PRESSURE: 70 MMHG | SYSTOLIC BLOOD PRESSURE: 128 MMHG

## 2018-10-16 DIAGNOSIS — G56.31 SUPERFICIAL RADIAL NERVE LESION, RIGHT: Primary | ICD-10-CM

## 2018-10-16 PROCEDURE — 99203 OFFICE O/P NEW LOW 30 MIN: CPT | Performed by: PHYSICAL MEDICINE & REHABILITATION

## 2018-10-16 NOTE — H&P
[address] H&P    Requesting Physician: Dr. Delon Bowling  Chief Complaint (Reason for Visit):  Patient presents with:  Arm Pain: Patient presnets for arm pain on right hand above her wrist, had for years • Cancer Mother    • Diabetes Other         daughter   • Glaucoma Neg    • Macular degeneration Neg        SOCIAL HISTORY:   Social History    Occupational History      Not on file    Tobacco Use      Smoking status: Never Smoker      Smokeless tobacco: Patient Position: Sitting, Cuff Size: adult)   Pulse 84   Resp 16   Ht 63\"   Wt 131 lb   BMI 23.21 kg/m²     Body mass index is 23.21 kg/m². General: No immediate distress  Head: Normocephalic/ Atraumatic  Eyes: Extra-occular movements intact.    Ears Final   • Cholesterol, Total 06/29/2018 223* 110 - 200 mg/dL Final   • Triglycerides 06/29/2018 102  1 - 149 mg/dL Final   • Non HDL Chol 06/29/2018 157* <130 mg/dL Final   • LDL Cholesterol 06/29/2018 137* 0 - 99 mg/dL Final   • WBC 06/29/2018 3.4* 4.0 - PROCEDURE: MRI SPINE CERVICAL (CPT=72141)     COMPARISON: [facility] for [facility], XR CERVICAL SPINE (4 VIEWS) (CPT=72050), 8/02/2018, 15:24. INDICATIONS: Right arm pain for several years, no injury.      TECHNIQUE: A variety of imaging plan the ventral thecal space with ventral cord abutment. The dorsal thecal   space remains patent. C7-T1: Disk osteophyte complex, facet, and uncinate joint hypertrophy result in mild narrowing of the canal and bilateral neural foramen.      Impression: [facility]

## 2018-10-24 ENCOUNTER — TELEPHONE (OUTPATIENT)
Dept: NEUROLOGY | Facility: CLINIC | Age: 83
End: 2018-10-24

## 2018-10-24 NOTE — TELEPHONE ENCOUNTER
S/w pt re: MRI c spine results; defer to Dr. Joy Valdes re: ? add'l interventions for radiculopathy.

## 2018-10-25 ENCOUNTER — TELEPHONE (OUTPATIENT)
Dept: NEUROLOGY | Facility: CLINIC | Age: 83
End: 2018-10-25

## 2018-11-02 NOTE — TELEPHONE ENCOUNTER
[employer] called asking if we can do a prior authorization for voltaren gel. Notified the rep that per office policy we do not do PA for voltaren gel. Rep verbalized understanding and will contact the patient.

## 2018-11-23 RX ORDER — TOPIRAMATE 100 MG/1
TABLET, FILM COATED ORAL
Qty: 90 TABLET | Refills: 0 | Status: SHIPPED | OUTPATIENT
Start: 2018-11-23 | End: 2019-02-18

## 2018-11-28 ENCOUNTER — TELEPHONE (OUTPATIENT)
Dept: NEUROLOGY | Facility: CLINIC | Age: 83
End: 2018-11-28

## 2018-11-28 NOTE — TELEPHONE ENCOUNTER
Per office policy prior authorization is not done for diclofenac gel. Case discussed with Dr. George Dale today, suggested patient may pay out of pocket if patient desires.

## 2018-12-05 ENCOUNTER — TELEPHONE (OUTPATIENT)
Dept: NEUROLOGY | Facility: CLINIC | Age: 83
End: 2018-12-05

## 2018-12-13 ENCOUNTER — OFFICE VISIT (OUTPATIENT)
Dept: OPHTHALMOLOGY | Facility: CLINIC | Age: 83
End: 2018-12-13
Payer: COMMERCIAL

## 2018-12-13 DIAGNOSIS — H43.391 FLOATERS IN VISUAL FIELD, RIGHT: ICD-10-CM

## 2018-12-13 DIAGNOSIS — H04.123 DRY EYES, BILATERAL: ICD-10-CM

## 2018-12-13 DIAGNOSIS — Z96.1 PSEUDOPHAKIA, RIGHT EYE: ICD-10-CM

## 2018-12-13 DIAGNOSIS — H25.12 AGE-RELATED NUCLEAR CATARACT OF LEFT EYE: Primary | ICD-10-CM

## 2018-12-13 PROCEDURE — 92015 DETERMINE REFRACTIVE STATE: CPT | Performed by: OPHTHALMOLOGY

## 2018-12-13 PROCEDURE — 92014 COMPRE OPH EXAM EST PT 1/>: CPT | Performed by: OPHTHALMOLOGY

## 2018-12-13 NOTE — PATIENT INSTRUCTIONS
Pseudophakia, right eye  No treatment. Age-related nuclear cataract of left eye  Discussed with patient that cataract in the left eye is advanced enough at this time to consider surgery; it would be patient's choice.   Discussed options such as surgery

## 2018-12-13 NOTE — PROGRESS NOTES
Mai Ramírez is a 80year old female. HPI:     HPI     Patient is here for a complete exam.  Patient complains of difficulty seeing clear with her glasses. She is also  complaining of dry eyes OU.    Patient  is using Systane gtts  OU 3-4 times a day x 1 week, then 2 capsules at bedtime x 1 week, then 3 capsules at bedtime Disp: 90 capsule Rfl: 1   triamcinolone acetonide 0.025 % External Cream Apply topically 2 (two) times daily.  Disp:  Rfl:        Allergies:  No Known Allergies    ROS:       PHYSICAL 20/20    Left -2.25 +1.00 155 20/40- +3.25 20/30    Type:  Progressive bifocal                 ASSESSMENT/PLAN:     Diagnoses and Plan:     Pseudophakia, right eye  No treatment.      Age-related nuclear cataract of left eye  Discussed with patient that cat ordered in this encounter        Follow up instructions:  Return in about 1 year (around 12/13/2019) for Dilated exam.    12/13/2018  Scribed by: Rowan Subramanian MD

## 2019-01-02 RX ORDER — LEVOTHYROXINE SODIUM 0.07 MG/1
TABLET ORAL
Qty: 90 TABLET | Refills: 0 | Status: SHIPPED | OUTPATIENT
Start: 2019-01-02 | End: 2019-02-20

## 2019-01-02 NOTE — TELEPHONE ENCOUNTER
Refill passed per [place] [facility] [place] protocol.   Hypothyroid Medications  Protocol Criteria:  Appointment scheduled in the past 12 months or the next 3 months  TSH resulted in the past 12 months that is normal  Recent Outpatient Visits            2 weeks ago Age-related nuclear cataract of left eye    [facility] Eric Rosenthal MD    Office Visit    2 months ago Superficial radial nerve lesion, right    [facility], [address] [facility], [address], Davina Sneed MD    Office Visit    3 months ago Cervical radiculopathy    [facility], [address] [facility] [address] [facility], [address], Susana Meier MD    Office Visit    3 months ago     [address] [place], [place], [address]    Office Visit    3 months ago     [address] [place], [place], [address]    Office Visit          Lab Results   Component Value Date    TSH 4.54 08/18/2018

## 2019-01-10 NOTE — TELEPHONE ENCOUNTER
Luma from [facility] calling to see why prior authorization was not done for diclofenac gel. Advised is office policy and discussed on 11/2/18 with patient`s pharmacy that was inturn going to let patient know.   See phone encounter 10/25/18 for abraham

## 2019-02-19 RX ORDER — TOPIRAMATE 100 MG/1
TABLET, FILM COATED ORAL
Qty: 90 TABLET | Refills: 0 | Status: SHIPPED | OUTPATIENT
Start: 2019-02-19 | End: 2019-05-24

## 2019-02-20 RX ORDER — LEVOTHYROXINE SODIUM 0.07 MG/1
TABLET ORAL
Qty: 90 TABLET | Refills: 0 | Status: SHIPPED | OUTPATIENT
Start: 2019-02-20 | End: 2019-05-24

## 2019-02-21 NOTE — TELEPHONE ENCOUNTER
Refill passed per [place], [facility] protocol.   Hypothyroid Medications  Protocol Criteria:  Appointment scheduled in the past 12 months or the next 3 months  TSH resulted in the past 12 months that is normal  Recent Outpatient Visits            2 months ago Age-related nuclear cataract of left eye    [facility] Eric Rosenthal MD    Office Visit    4 months ago Superficial radial nerve lesion, right    [facility] Luis Stone MD    Office Visit    4 months ago Cervical radiculopathy    [facility] Tawana Clark MD    Office Visit    4 months ago     [facility] [place]    Office Visit    5 months ago     [facility]    Office Visit        Future Appointments       Provider Department Appt Notes    In 3 months Astrid Murray MD [facility], [facility], [address] [facility] annual         Lab Results   Component Value Date    TSH 4.54 08/18/2018

## 2019-04-06 RX ORDER — KETOCONAZOLE 20 MG/ML
SHAMPOO TOPICAL
Qty: 120 ML | OUTPATIENT
Start: 2019-04-06

## 2019-05-24 RX ORDER — LEVOTHYROXINE SODIUM 0.07 MG/1
TABLET ORAL
Qty: 90 TABLET | Refills: 1 | Status: SHIPPED | OUTPATIENT
Start: 2019-05-24 | End: 2019-06-20

## 2019-05-24 RX ORDER — TOPIRAMATE 100 MG/1
TABLET, FILM COATED ORAL
Qty: 90 TABLET | Refills: 1 | Status: SHIPPED | OUTPATIENT
Start: 2019-05-24 | End: 2019-12-13

## 2019-06-14 ENCOUNTER — OFFICE VISIT (OUTPATIENT)
Dept: INTERNAL MEDICINE CLINIC | Facility: CLINIC | Age: 84
End: 2019-06-14
Payer: COMMERCIAL

## 2019-06-14 ENCOUNTER — APPOINTMENT (OUTPATIENT)
Dept: LAB | Age: 84
End: 2019-06-14
Attending: INTERNAL MEDICINE
Payer: MEDICARE

## 2019-06-14 ENCOUNTER — MA CHART PREP (OUTPATIENT)
Dept: FAMILY MEDICINE CLINIC | Facility: CLINIC | Age: 84
End: 2019-06-14

## 2019-06-14 VITALS
WEIGHT: 134 LBS | SYSTOLIC BLOOD PRESSURE: 124 MMHG | HEIGHT: 63 IN | HEART RATE: 77 BPM | BODY MASS INDEX: 23.74 KG/M2 | DIASTOLIC BLOOD PRESSURE: 80 MMHG

## 2019-06-14 DIAGNOSIS — E03.9 HYPOTHYROIDISM, UNSPECIFIED TYPE: ICD-10-CM

## 2019-06-14 DIAGNOSIS — E78.5 HYPERLIPIDEMIA, UNSPECIFIED HYPERLIPIDEMIA TYPE: ICD-10-CM

## 2019-06-14 DIAGNOSIS — Z00.00 ENCOUNTER FOR ANNUAL HEALTH EXAMINATION: ICD-10-CM

## 2019-06-14 DIAGNOSIS — M54.31 SCIATICA OF RIGHT SIDE: ICD-10-CM

## 2019-06-14 DIAGNOSIS — Z85.3 HISTORY OF RIGHT BREAST CANCER: ICD-10-CM

## 2019-06-14 DIAGNOSIS — M47.812 FACET ARTHRITIS OF CERVICAL REGION: ICD-10-CM

## 2019-06-14 DIAGNOSIS — Z00.00 ENCOUNTER FOR MEDICARE ANNUAL WELLNESS EXAM: Primary | ICD-10-CM

## 2019-06-14 PROBLEM — G56.31: Status: RESOLVED | Noted: 2018-10-16 | Resolved: 2019-06-14

## 2019-06-14 PROCEDURE — 99397 PER PM REEVAL EST PAT 65+ YR: CPT | Performed by: INTERNAL MEDICINE

## 2019-06-14 PROCEDURE — 96160 PT-FOCUSED HLTH RISK ASSMT: CPT | Performed by: INTERNAL MEDICINE

## 2019-06-14 PROCEDURE — G0439 PPPS, SUBSEQ VISIT: HCPCS | Performed by: INTERNAL MEDICINE

## 2019-06-14 NOTE — PATIENT INSTRUCTIONS
Margie Strange's SCREENING SCHEDULE   Tests on this list are recommended by your physician but may not be covered, or covered at this frequency, by your insurer. Please check with your insurance carrier before scheduling to verify coverage.    PREVENTATIV Colorectal Cancer Screening  Covered up to Age 76     Colonoscopy Screen   Covered every 10 years- more often if abnormal There are no preventive care reminders to display for this patient.  Update Health Maintenance if applicable    Flex Sigmoidoscopy S ANTIG    Please get every year    Pneumococcal 13 (Prevnar)  Covered Once after 65 Orders placed or performed in visit on 04/22/16   • PNEUMOCOCCAL VACC, 13 ARMANDO IM    Please get once after your 65th birthday    Pneumococcal 23 (Pneumovax)  Covered Once aft your physician but may not be covered, or covered at this frequency, by your insurer. Please check with your insurance carrier before scheduling to verify coverage.    PREVENTATIVE SERVICES  INDICATIONS AND SCHEDULE Internal Lab or Procedure External Lab or Covered every 10 years- more often if abnormal There are no preventive care reminders to display for this patient.  Update Health Maintenance if applicable    Flex Sigmoidoscopy Screen  Covered every 5 years No results found for this or any previous visit after 65 Orders placed or performed in visit on 04/22/16   • PNEUMOCOCCAL VACC, 13 ARMANDO IM    Please get once after your 65th birthday    Pneumococcal 23 (Pneumovax)  Covered Once after 65 Orders placed or performed in visit on 08/17/18   • PNEUMOCOCCAL IMM

## 2019-06-14 NOTE — PROGRESS NOTES
HPI:   Rima Rodriguez is a 80year old female who presents for a Medicare Subsequent Annual Wellness visit (Pt already had Initial Annual Wellness).       Annual Physical due on 08/17/2019      Chronic neck pain   Controlled  Low back pain   While in bed an lower lids     Osteopenia     Pseudophakia, right eye     Age-related nuclear cataract of left eye     Superficial radial nerve lesion, right     Floaters in visual field, right     Facet arthritis of cervical region ([address]    Wt Readings from Last 3 Encoun mother; Diabetes in an other family member. SOCIAL HISTORY:   She  reports that she has never smoked. She has never used smokeless tobacco. She reports that she does not drink alcohol or use drugs.      REVIEW OF SYSTEMS:   Review of Systems   Constitutio I have trouble hearing conversations in a noisy background such as a crowded room or restaurant:  Yes   I get confused about where sounds come from:  Yes I misunderstand some words in a sentence and need to ask people to repeat themselves:  No   I especial Psychiatric: Her behavior is normal. Thought content normal.        Vaccination History     Immunization History   Administered Date(s) Administered   • FLU VACC High Dose 65 YRS & Older PRSV Free (95414) 10/17/2018   • HIGH DOSE FLU 65 YRS AND OLDER PRS Moderate  How would you describe your current health state?: Fair  How do you maintain positive mental well-being?: Visiting Friends; Visiting Family      This section provided for quick review of chart, separate sheet to patient  PREVENTATIVE SERVICES  IND if applicable     Immunizations (Update Immunization Activity if applicable)     Influenza  Covered Annually 10/17/2018 Please get every year    Pneumococcal 13 (Prevnar)  Covered Once after 65 04/22/2016 Please get once after your 65th birthday    Jerzy Low

## 2019-06-18 ENCOUNTER — APPOINTMENT (OUTPATIENT)
Dept: LAB | Age: 84
End: 2019-06-18
Attending: INTERNAL MEDICINE
Payer: MEDICARE

## 2019-06-18 DIAGNOSIS — M54.31 SCIATICA OF RIGHT SIDE: ICD-10-CM

## 2019-06-18 PROCEDURE — 81015 MICROSCOPIC EXAM OF URINE: CPT

## 2019-06-18 PROCEDURE — 84439 ASSAY OF FREE THYROXINE: CPT

## 2019-06-18 PROCEDURE — 84443 ASSAY THYROID STIM HORMONE: CPT

## 2019-06-18 PROCEDURE — 85027 COMPLETE CBC AUTOMATED: CPT

## 2019-06-18 PROCEDURE — 36415 COLL VENOUS BLD VENIPUNCTURE: CPT

## 2019-06-18 PROCEDURE — 80061 LIPID PANEL: CPT

## 2019-06-18 PROCEDURE — 80053 COMPREHEN METABOLIC PANEL: CPT

## 2019-07-18 ENCOUNTER — OFFICE VISIT (OUTPATIENT)
Dept: PAIN CLINIC | Facility: HOSPITAL | Age: 84
End: 2019-07-18
Attending: INTERNAL MEDICINE
Payer: MEDICARE

## 2019-07-18 VITALS
HEART RATE: 71 BPM | DIASTOLIC BLOOD PRESSURE: 77 MMHG | RESPIRATION RATE: 18 BRPM | HEIGHT: 63 IN | WEIGHT: 134 LBS | SYSTOLIC BLOOD PRESSURE: 122 MMHG | BODY MASS INDEX: 23.74 KG/M2

## 2019-07-18 DIAGNOSIS — M79.604 LOWER EXTREMITY PAIN, BILATERAL: Primary | ICD-10-CM

## 2019-07-18 DIAGNOSIS — M79.605 LOWER EXTREMITY PAIN, BILATERAL: Primary | ICD-10-CM

## 2019-07-18 PROCEDURE — 99201 HC OUTPT EVAL AND MGNT NEW PT LEVEL 1: CPT

## 2019-07-18 RX ORDER — METHOCARBAMOL 500 MG/1
500 TABLET, FILM COATED ORAL 3 TIMES DAILY
Qty: 90 TABLET | Refills: 0 | Status: SHIPPED | OUTPATIENT
Start: 2019-07-18 | End: 2020-01-01

## 2019-07-18 NOTE — PROGRESS NOTES
07/18/19  PRESENTS AMBULATORY TO [facility];  NEW CONSULT C/O BILATERAL ANKLE PAIN;  PT REPORTS 2YRS OA ANKLE PAIN;  DENIES INJURY/TRAUMA;  HAS 0/10 PAIN TODAY;  PT STATES ONLY GETS PAIN HEN LYING IN  B[place] [address] [place];  UNABLE TO SLEEP THE PAIN IS SO BAD;  \"HAVE TO GE

## 2019-12-06 ENCOUNTER — APPOINTMENT (OUTPATIENT)
Dept: LAB | Age: 84
End: 2019-12-06
Attending: INTERNAL MEDICINE
Payer: MEDICARE

## 2019-12-06 DIAGNOSIS — E03.9 HYPOTHYROIDISM, UNSPECIFIED TYPE: ICD-10-CM

## 2019-12-06 PROCEDURE — 36415 COLL VENOUS BLD VENIPUNCTURE: CPT

## 2019-12-06 PROCEDURE — 84443 ASSAY THYROID STIM HORMONE: CPT

## 2019-12-06 PROCEDURE — 84439 ASSAY OF FREE THYROXINE: CPT

## 2019-12-12 RX ORDER — LEVOTHYROXINE SODIUM 0.05 MG/1
50 TABLET ORAL
Qty: 90 TABLET | Refills: 1 | Status: SHIPPED | OUTPATIENT
Start: 2019-12-12 | End: 2020-05-07

## 2019-12-12 NOTE — TELEPHONE ENCOUNTER
PT would like refill on RX levothyroxine 50MCG. Pt stts out of medication.  Please advise         Current Outpatient Medications   Medication Sig Dispense Refill   •   90 tablet 0   • Levothyroxine Sodium 50 MCG Oral Tab Take 1 tablet (50 mcg total) by aleisha

## 2019-12-12 NOTE — TELEPHONE ENCOUNTER
Refill passed per [facility], [facility] protocol.     Hypothyroid Medications  Protocol Criteria:  Appointment scheduled in the past 12 months or the next 3 months  TSH resulted in the past 12 months that is normal  Recent Outpatient Visits            4 months ag

## 2019-12-14 RX ORDER — TOPIRAMATE 100 MG/1
TABLET, FILM COATED ORAL
Qty: 90 TABLET | Refills: 0 | Status: SHIPPED | OUTPATIENT
Start: 2019-12-14 | End: 2020-04-08

## 2019-12-14 NOTE — TELEPHONE ENCOUNTER
Review pended refill request as it does not fall under a protocol.     Last Rx: 06/24/19  LOV: 06/4/19

## 2020-01-01 ENCOUNTER — APPOINTMENT (OUTPATIENT)
Dept: LAB | Age: 85
End: 2020-01-01
Attending: INTERNAL MEDICINE
Payer: MEDICARE

## 2020-01-01 ENCOUNTER — TELEPHONE (OUTPATIENT)
Dept: INTERNAL MEDICINE CLINIC | Facility: CLINIC | Age: 85
End: 2020-01-01

## 2020-01-01 ENCOUNTER — OFFICE VISIT (OUTPATIENT)
Dept: INTERNAL MEDICINE CLINIC | Facility: CLINIC | Age: 85
End: 2020-01-01
Payer: COMMERCIAL

## 2020-01-01 ENCOUNTER — LAB ENCOUNTER (OUTPATIENT)
Dept: LAB | Age: 85
End: 2020-01-01
Attending: INTERNAL MEDICINE
Payer: MEDICARE

## 2020-01-01 ENCOUNTER — HOSPITAL ENCOUNTER (OUTPATIENT)
Dept: CV DIAGNOSTICS | Facility: HOSPITAL | Age: 85
Discharge: HOME | End: 2020-01-01
Attending: INTERNAL MEDICINE
Payer: MEDICARE

## 2020-01-01 VITALS
HEIGHT: 63 IN | HEART RATE: 96 BPM | TEMPERATURE: 98 F | DIASTOLIC BLOOD PRESSURE: 77 MMHG | BODY MASS INDEX: 22.04 KG/M2 | WEIGHT: 124.38 LBS | SYSTOLIC BLOOD PRESSURE: 116 MMHG

## 2020-01-01 VITALS
WEIGHT: 125 LBS | HEIGHT: 63 IN | BODY MASS INDEX: 22.15 KG/M2 | DIASTOLIC BLOOD PRESSURE: 72 MMHG | SYSTOLIC BLOOD PRESSURE: 112 MMHG | TEMPERATURE: 98 F | HEART RATE: 87 BPM

## 2020-01-01 DIAGNOSIS — Z85.3 HISTORY OF RIGHT BREAST CANCER: ICD-10-CM

## 2020-01-01 DIAGNOSIS — F32.A DEPRESSION, UNSPECIFIED DEPRESSION TYPE: ICD-10-CM

## 2020-01-01 DIAGNOSIS — E78.5 HYPERLIPIDEMIA, UNSPECIFIED HYPERLIPIDEMIA TYPE: Primary | ICD-10-CM

## 2020-01-01 DIAGNOSIS — E55.9 VITAMIN D DEFICIENCY: ICD-10-CM

## 2020-01-01 DIAGNOSIS — D50.9 IRON DEFICIENCY ANEMIA, UNSPECIFIED IRON DEFICIENCY ANEMIA TYPE: Primary | ICD-10-CM

## 2020-01-01 DIAGNOSIS — Z00.00 ENCOUNTER FOR ANNUAL HEALTH EXAMINATION: Primary | ICD-10-CM

## 2020-01-01 DIAGNOSIS — E03.9 HYPOTHYROIDISM, UNSPECIFIED TYPE: ICD-10-CM

## 2020-01-01 DIAGNOSIS — M47.812 FACET ARTHRITIS OF CERVICAL REGION: ICD-10-CM

## 2020-01-01 DIAGNOSIS — E78.5 HYPERLIPIDEMIA, UNSPECIFIED HYPERLIPIDEMIA TYPE: ICD-10-CM

## 2020-01-01 DIAGNOSIS — R14.0 BLOATING: ICD-10-CM

## 2020-01-01 DIAGNOSIS — G25.81 RESTLESS LEGS: ICD-10-CM

## 2020-01-01 DIAGNOSIS — R94.31 ABNORMAL EKG: ICD-10-CM

## 2020-01-01 LAB
25(OH)D3 SERPL-MCNC: 31.9 NG/ML (ref 30–100)
ALBUMIN SERPL-MCNC: 3.1 G/DL (ref 3.4–5)
ALBUMIN/GLOB SERPL: 0.7 {RATIO} (ref 1–2)
ALP LIVER SERPL-CCNC: 123 U/L (ref 55–142)
ALT SERPL-CCNC: 20 U/L (ref 13–56)
ANION GAP SERPL CALC-SCNC: 6 MMOL/L (ref 0–18)
AST SERPL-CCNC: 17 U/L (ref 15–37)
BILIRUB SERPL-MCNC: 0.3 MG/DL (ref 0.1–2)
BUN BLD-MCNC: 11 MG/DL (ref 7–18)
BUN/CREAT SERPL: 14.3 (ref 10–20)
CALCIUM BLD-MCNC: 9.2 MG/DL (ref 8.5–10.1)
CHLORIDE SERPL-SCNC: 111 MMOL/L (ref 98–112)
CHOLEST SMN-MCNC: 222 MG/DL (ref ?–200)
CO2 SERPL-SCNC: 24 MMOL/L (ref 21–32)
CREAT BLD-MCNC: 0.77 MG/DL (ref 0.55–1.02)
DEPRECATED RDW RBC AUTO: 48.5 FL (ref 35.1–46.3)
ERYTHROCYTE [DISTWIDTH] IN BLOOD BY AUTOMATED COUNT: 17.2 % (ref 11–15)
EST. AVERAGE GLUCOSE BLD GHB EST-MCNC: 111 MG/DL (ref 68–126)
FOLATE SERPL-MCNC: >20 NG/ML (ref 8.7–?)
GLOBULIN PLAS-MCNC: 4.6 G/DL (ref 2.8–4.4)
GLUCOSE BLD-MCNC: 85 MG/DL (ref 70–99)
HBA1C MFR BLD HPLC: 5.5 % (ref ?–5.7)
HCT VFR BLD AUTO: 32.1 % (ref 35–48)
HDLC SERPL-MCNC: 71 MG/DL (ref 40–59)
HGB BLD-MCNC: 9.6 G/DL (ref 12–16)
LDLC SERPL CALC-MCNC: 130 MG/DL (ref ?–100)
M PROTEIN MFR SERPL ELPH: 7.7 G/DL (ref 6.4–8.2)
MCH RBC QN AUTO: 23.2 PG (ref 26–34)
MCHC RBC AUTO-ENTMCNC: 29.9 G/DL (ref 31–37)
MCV RBC AUTO: 77.5 FL (ref 80–100)
NONHDLC SERPL-MCNC: 151 MG/DL (ref ?–130)
OSMOLALITY SERPL CALC.SUM OF ELEC: 291 MOSM/KG (ref 275–295)
PATIENT FASTING Y/N/NP: NO
PATIENT FASTING Y/N/NP: NO
PLATELET # BLD AUTO: 341 10(3)UL (ref 150–450)
POTASSIUM SERPL-SCNC: 4 MMOL/L (ref 3.5–5.1)
RBC # BLD AUTO: 4.14 X10(6)UL (ref 3.8–5.3)
RBC #/AREA URNS AUTO: 2 /HPF
SODIUM SERPL-SCNC: 141 MMOL/L (ref 136–145)
T4 FREE SERPL-MCNC: 1.1 NG/DL (ref 0.8–1.7)
TRIGL SERPL-MCNC: 106 MG/DL (ref 30–149)
TSI SER-ACNC: 5.99 MIU/ML (ref 0.36–3.74)
VIT B12 SERPL-MCNC: 546 PG/ML (ref 193–986)
VLDLC SERPL CALC-MCNC: 21 MG/DL (ref 0–30)
WBC # BLD AUTO: 4.5 X10(3) UL (ref 4–11)
WBC #/AREA URNS AUTO: 8 /HPF

## 2020-01-01 PROCEDURE — 82746 ASSAY OF FOLIC ACID SERUM: CPT | Performed by: INTERNAL MEDICINE

## 2020-01-01 PROCEDURE — G0463 HOSPITAL OUTPT CLINIC VISIT: HCPCS | Performed by: INTERNAL MEDICINE

## 2020-01-01 PROCEDURE — 99397 PER PM REEVAL EST PAT 65+ YR: CPT | Performed by: INTERNAL MEDICINE

## 2020-01-01 PROCEDURE — 83036 HEMOGLOBIN GLYCOSYLATED A1C: CPT | Performed by: INTERNAL MEDICINE

## 2020-01-01 PROCEDURE — G0439 PPPS, SUBSEQ VISIT: HCPCS | Performed by: INTERNAL MEDICINE

## 2020-01-01 PROCEDURE — 36415 COLL VENOUS BLD VENIPUNCTURE: CPT | Performed by: INTERNAL MEDICINE

## 2020-01-01 PROCEDURE — 85025 COMPLETE CBC W/AUTO DIFF WBC: CPT | Performed by: INTERNAL MEDICINE

## 2020-01-01 PROCEDURE — 3008F BODY MASS INDEX DOCD: CPT | Performed by: INTERNAL MEDICINE

## 2020-01-01 PROCEDURE — 93306 TTE W/DOPPLER COMPLETE: CPT | Performed by: INTERNAL MEDICINE

## 2020-01-01 PROCEDURE — 82306 VITAMIN D 25 HYDROXY: CPT | Performed by: INTERNAL MEDICINE

## 2020-01-01 PROCEDURE — 84439 ASSAY OF FREE THYROXINE: CPT | Performed by: INTERNAL MEDICINE

## 2020-01-01 PROCEDURE — 96160 PT-FOCUSED HLTH RISK ASSMT: CPT | Performed by: INTERNAL MEDICINE

## 2020-01-01 PROCEDURE — 80061 LIPID PANEL: CPT | Performed by: INTERNAL MEDICINE

## 2020-01-01 PROCEDURE — 99214 OFFICE O/P EST MOD 30 MIN: CPT | Performed by: INTERNAL MEDICINE

## 2020-01-01 PROCEDURE — 93010 ELECTROCARDIOGRAM REPORT: CPT | Performed by: INTERNAL MEDICINE

## 2020-01-01 PROCEDURE — 93005 ELECTROCARDIOGRAM TRACING: CPT

## 2020-01-01 PROCEDURE — 84466 ASSAY OF TRANSFERRIN: CPT | Performed by: INTERNAL MEDICINE

## 2020-01-01 PROCEDURE — 87086 URINE CULTURE/COLONY COUNT: CPT

## 2020-01-01 PROCEDURE — 80053 COMPREHEN METABOLIC PANEL: CPT | Performed by: INTERNAL MEDICINE

## 2020-01-01 PROCEDURE — 83540 ASSAY OF IRON: CPT | Performed by: INTERNAL MEDICINE

## 2020-01-01 PROCEDURE — 81015 MICROSCOPIC EXAM OF URINE: CPT

## 2020-01-01 PROCEDURE — 3074F SYST BP LT 130 MM HG: CPT | Performed by: INTERNAL MEDICINE

## 2020-01-01 PROCEDURE — 82607 VITAMIN B-12: CPT | Performed by: INTERNAL MEDICINE

## 2020-01-01 PROCEDURE — 84443 ASSAY THYROID STIM HORMONE: CPT | Performed by: INTERNAL MEDICINE

## 2020-01-01 PROCEDURE — 3078F DIAST BP <80 MM HG: CPT | Performed by: INTERNAL MEDICINE

## 2020-01-01 PROCEDURE — 85027 COMPLETE CBC AUTOMATED: CPT | Performed by: INTERNAL MEDICINE

## 2020-01-01 RX ORDER — TOPIRAMATE 100 MG/1
TABLET, FILM COATED ORAL
Qty: 90 TABLET | Refills: 3 | Status: SHIPPED | OUTPATIENT
Start: 2020-01-01

## 2020-01-01 RX ORDER — TOPIRAMATE 100 MG/1
TABLET, FILM COATED ORAL
Qty: 90 TABLET | Refills: 0 | Status: SHIPPED | OUTPATIENT
Start: 2020-01-01 | End: 2020-01-01

## 2020-01-01 RX ORDER — ROPINIROLE 0.5 MG/1
0.5 TABLET, FILM COATED ORAL NIGHTLY
Qty: 30 TABLET | Refills: 0 | Status: SHIPPED | OUTPATIENT
Start: 2020-01-01 | End: 2020-01-01

## 2020-01-01 RX ORDER — ROPINIROLE 0.5 MG/1
0.5 TABLET, FILM COATED ORAL NIGHTLY
Qty: 30 TABLET | Refills: 0 | Status: SHIPPED | OUTPATIENT
Start: 2020-01-01

## 2020-01-01 RX ORDER — LEVOTHYROXINE SODIUM 0.05 MG/1
TABLET ORAL
Qty: 90 TABLET | Refills: 3 | Status: SHIPPED | OUTPATIENT
Start: 2020-01-01

## 2020-02-18 ENCOUNTER — TELEPHONE (OUTPATIENT)
Dept: CASE MANAGEMENT | Age: 85
End: 2020-02-18

## 2020-04-08 RX ORDER — TOPIRAMATE 100 MG/1
TABLET, FILM COATED ORAL
Qty: 90 TABLET | Refills: 0 | Status: SHIPPED | OUTPATIENT
Start: 2020-04-08 | End: 2020-01-01

## 2020-05-07 RX ORDER — LEVOTHYROXINE SODIUM 0.05 MG/1
TABLET ORAL
Qty: 90 TABLET | Refills: 0 | Status: SHIPPED | OUTPATIENT
Start: 2020-05-07 | End: 2020-01-01

## 2020-06-11 ENCOUNTER — TELEPHONE (OUTPATIENT)
Dept: INTERNAL MEDICINE CLINIC | Facility: CLINIC | Age: 85
End: 2020-06-11

## 2020-06-11 NOTE — TELEPHONE ENCOUNTER
Phone room called patient and re-scheduled her for July for her annual physical.    Patient reports fatigue, difficulty sleeping, weight loss/ weighs 130 lbs. ..has been gradual over the past 2months. Having trouble breathing for 2 months.   doesn't like ai

## 2020-06-12 ENCOUNTER — MA CHART PREP (OUTPATIENT)
Dept: FAMILY MEDICINE CLINIC | Facility: CLINIC | Age: 85
End: 2020-06-12

## 2020-06-19 NOTE — TELEPHONE ENCOUNTER
Left message to pt to call back.      Future Appointments   Date Time Provider Fer Clinton   7/9/2020 10:20 AM Obed Fitzpatrick MD [facility] WALDO Ngo

## 2020-06-19 NOTE — TELEPHONE ENCOUNTER
Sent to me as an FYI    appt in July  Symptoms noted  I can see her sooner if agreeable   Please schedule  How is she?

## 2020-06-19 NOTE — TELEPHONE ENCOUNTER
Reviewed Dr Irena Allen orders below on 6/19/20. Pt stated symptoms about the same and will keep current scheduled appointment 7/9/20. Pt was informed if symptoms worsen to call back for sooner appt. Pt verbalized understanding and agreed with plan.     Tasked

## 2020-07-13 NOTE — TELEPHONE ENCOUNTER
Spoke with sarah from lab infromed Dr. Chana Richmond signed lab order and she voiced understanding.

## 2020-07-13 NOTE — TELEPHONE ENCOUNTER
Per Jeff Bucio, pt is there at the lab to get blood work done. Jeff Bucio states that the patient informed her that the cholesterol order is missing. Pt would like to have it done also.

## 2020-07-19 NOTE — PROGRESS NOTES
HPI:   Uri Brown is a 80year old female who presents for a Medicare Subsequent Annual Wellness visit (Pt already had Initial Annual Wellness).       Her last annual assessment has been over 1 year: Annual Physical due on 06/14/2020         Fall/Risk A Encounters:  07/09/20 : 124 lb 6.4 oz (56.4 kg)  07/18/19 : 134 lb (60.8 kg)  06/14/19 : 134 lb (60.8 kg)     Last Cholesterol Labs:   Lab Results   Component Value Date    CHOLEST 222 (H) 07/13/2020    HDL 71 (H) 07/13/2020     (H) 07/13/2020    TR drink alcohol or use drugs. REVIEW OF SYSTEMS:   Review of Systems   Constitutional: Negative for activity change, chills, fatigue and fever. HENT: Negative for congestion, ear pain, hearing loss, sinus pressure and sore throat.     Eyes: Negative for words in a sentence and need to ask people to repeat themselves:  Sometimes   I especially have trouble understanding the speech of women and children:  No I have trouble understanding the speaker in a large room such as at a meeting or place of Sabianism: FLU VACC High Dose 65 YRS & Older PRSV Free (78304) 10/17/2018   • HIGH DOSE FLU 65 YRS AND OLDER PRSV FREE SINGLE D (00652) FLU CLINIC 12/13/2016   • Pneumococcal (Prevnar 13) 04/22/2016   • Pneumovax 23 08/17/2018        ASSESSMENT AND OTHER RELEVANT CHR activity?: Moderate  How would you describe your current health state?: Fair      This section provided for quick review of chart, separate sheet to patient  [address] Internal Lab or Procedure External Lab or Procedur history found Please get every year    Pneumococcal 13 (Prevnar)  Covered Once after 65 04/22/2016 Please get once after your 65th birthday    Pneumococcal 23 (Pneumovax)  Covered Once after 65 08/17/2018 Please get once after your 65th birthday    Mackenzie Hidalgo

## 2020-07-19 NOTE — PATIENT INSTRUCTIONS
Margie Strange's SCREENING SCHEDULE   Tests on this list are recommended by your physician but may not be covered, or covered at this frequency, by your insurer. Please check with your insurance carrier before scheduling to verify coverage.    PREVENTATIV every 10 years- more often if abnormal There are no preventive care reminders to display for this patient. Update Health Maintenance if applicable    Flex Sigmoidoscopy Screen  Covered every 5 years No results found for this or any previous visit.  No flows Orders placed or performed in visit on 04/22/16   • PNEUMOCOCCAL VACC, 13 ARMANDO IM    Please get once after your 65th birthday    Pneumococcal 23 (Pneumovax)  Covered Once after 65 Orders placed or performed in visit on 08/17/18   • PNEUMOCOCCAL IMM (Kindra Albertr

## 2020-07-24 NOTE — TELEPHONE ENCOUNTER
When speaking with patient regarding her test results, patient asked if there is a topical OTC or prescription cream that can be applied to her legs. She is stating she has muscle pains at night that keep her from sleeping.

## 2020-07-27 NOTE — TELEPHONE ENCOUNTER
Verified pt name and . Reviewed doctor's recommendations as noted below. Pt states she saw Dr. Jonelle Lennox for sciatica, states she didn't take medication as prescribed for sciatica as med made her nervous.  States she doesn't want to see any doctor other than

## 2020-07-31 NOTE — TELEPHONE ENCOUNTER
Patient calling back ,advised Dr Laurie Liu note and states that the name of the medication is methocarbamol. States that she took it one time and was fine but the second dose she feel anxious and very irritated. States that morning she is fine BUT pain is wo

## 2020-08-01 NOTE — TELEPHONE ENCOUNTER
Patient was called, given provider's recommendations and agreed to plan of care.  Scheduled for 08/18/20 at 3:20 PM    Future Appointments   Date Time Provider Fer [place]   8/18/2020  3:20 PM Nga Reza MD [facility]

## 2020-08-01 NOTE — TELEPHONE ENCOUNTER
She came in for medicare annual  I was not able to address her multiple medical problems at the time  I am not sure what medicine to give her  I ordered an MRI lumbar spine in 2019   order  Please have her make a follow up appt for proper eval

## 2020-08-03 NOTE — TELEPHONE ENCOUNTER
Patient calling reports [facility] did not receive her RX for Ferrous sulfate     Called [facility] , spoke with Orlando Willis, insurance will not cover the cost of RX     Called pt back, informed of name of med and can purchase it OTC     Patient verbalizes unde

## 2020-08-04 NOTE — CHRONIC PAIN
Governtasha Bautista Initial Consultation Note         Bilateral ankle pain  HISTORY OF PRESENT ILLNESS:  Tamie Mota is an 80 old female referred to the Center for pain management for evaluation and treatment of bilateral ankle pain.   Patient states the pain developed appr Negative  Integumentary :  Negative  Psychiatric:  Negative  Hematologic: No active bleeding  Lymphatic: No current lymphedema  Allergic/Immunologic:  Negative  Musculoskeletal: As above  Neurological: No deficits    MEDICAL HISTORY:  Patient Active Proble Non-medical: Not on file    Tobacco Use      Smoking status: Never Smoker      Smokeless tobacco: Never Used    Substance and Sexual Activity      Alcohol use: No      Drug use: No      Sexual activity: Not on file    Lifestyle      Physical activity: x3  Affect:  NAD  Head: normocephalic, atraumatic  Eyes: anicteric; no injection  Chest: S1, S2, RRR  Respiratory: CTAB  Gait: Normal; cane user - No  Spine: Normal.  Some tenderness to deep palpation in the right paraspinous area of the lower lumbar spine defer this. I did tell her we needed imaging studies in order to make the correct decision for possible injection therapy and physical therapy  2. I added a muscle relaxant for treatment. Her insurance only approves tizanidine  3.   If she has persistent

## 2020-08-31 NOTE — PROGRESS NOTES
HPI:    Patient ID: Nelsy Webber is a 80year old female.     HPI    Follow up   Joint pains  Complain of chronic leg pain  Wakes her up at night she walks wound and exercises and legs and she feels better  Pain is described as an ache  She lives alone an agitation and behavioral problems. Current Outpatient Medications   Medication Sig Dispense Refill   • rOPINIRole HCl 0.5 MG Oral Tab Take 1 tablet (0.5 mg total) by mouth nightly.  Cancel this prescription pt decided did not want to take the med 30 Glaucoma Neg    • Macular degeneration Neg       Social History: Social History    Tobacco Use      Smoking status: Never Smoker      Smokeless tobacco: Never Used    Alcohol use: No    Drug use: No       PHYSICAL EXAM:    Physical Exam   Constitutional: S Potassium      3.5 - 5.1 mmol/L  4.0   Chloride      98 - 112 mmol/L  111   Carbon Dioxide, Total      21.0 - 32.0 mmol/L  24.0   ANION GAP      0 - 18 mmol/L  6   BUN      7 - 18 mg/dL  11   CREATININE      0.55 - 1.02 mg/dL  0.77   BUN/CREAT Ratio controlled monitor    (E78.5) Hyperlipidemia, unspecified hyperlipidemia type  Plan: healthy diet adivsed    (E55.9) Vitamin D deficiency  Plan: supplement    (G25.81) Restless legs  Plan: trial low dose ropirinole  Pt pt not sure if she will take this med

## 2021-01-01 ENCOUNTER — APPOINTMENT (OUTPATIENT)
Dept: GENERAL RADIOLOGY | Facility: HOSPITAL | Age: 86
End: 2021-01-01
Attending: EMERGENCY MEDICINE
Payer: MEDICARE

## 2021-01-01 ENCOUNTER — HOSPITAL ENCOUNTER (EMERGENCY)
Facility: HOSPITAL | Age: 86
Discharge: HOME | End: 2021-01-01
Attending: EMERGENCY MEDICINE
Payer: MEDICARE

## 2021-01-01 ENCOUNTER — TELEPHONE (OUTPATIENT)
Dept: INTERNAL MEDICINE CLINIC | Facility: CLINIC | Age: 86
End: 2021-01-01

## 2021-01-01 ENCOUNTER — HOSPITAL ENCOUNTER (OUTPATIENT)
Dept: CT IMAGING | Facility: HOSPITAL | Age: 86
Discharge: HOME | End: 2021-01-01
Attending: NURSE PRACTITIONER
Payer: MEDICARE

## 2021-01-01 ENCOUNTER — EXTERNAL FACILITY (OUTPATIENT)
Dept: INTERNAL MEDICINE CLINIC | Facility: CLINIC | Age: 86
End: 2021-01-01

## 2021-01-01 ENCOUNTER — APPOINTMENT (OUTPATIENT)
Dept: CT IMAGING | Facility: HOSPITAL | Age: 86
End: 2021-01-01
Attending: EMERGENCY MEDICINE
Payer: MEDICARE

## 2021-01-01 VITALS
BODY MASS INDEX: 27 KG/M2 | SYSTOLIC BLOOD PRESSURE: 122 MMHG | WEIGHT: 150 LBS | OXYGEN SATURATION: 97 % | TEMPERATURE: 98 F | DIASTOLIC BLOOD PRESSURE: 64 MMHG | HEART RATE: 84 BPM | RESPIRATION RATE: 16 BRPM

## 2021-01-01 DIAGNOSIS — R62.7 ADULT FAILURE TO THRIVE: ICD-10-CM

## 2021-01-01 DIAGNOSIS — J20.8 ACUTE BRONCHITIS DUE TO OTHER SPECIFIED ORGANISMS: ICD-10-CM

## 2021-01-01 DIAGNOSIS — D50.9 MICROCYTIC ANEMIA: ICD-10-CM

## 2021-01-01 DIAGNOSIS — R05.9 COUGH: ICD-10-CM

## 2021-01-01 DIAGNOSIS — D64.9 ANEMIA, UNSPECIFIED TYPE: ICD-10-CM

## 2021-01-01 DIAGNOSIS — F41.9 ANXIETY: ICD-10-CM

## 2021-01-01 DIAGNOSIS — R63.4 WEIGHT LOSS: ICD-10-CM

## 2021-01-01 DIAGNOSIS — S32.9XXS CLOSED NONDISPLACED FRACTURE OF PELVIS, UNSPECIFIED PART OF PELVIS, SEQUELA: ICD-10-CM

## 2021-01-01 DIAGNOSIS — R53.83 FATIGUE, UNSPECIFIED TYPE: ICD-10-CM

## 2021-01-01 DIAGNOSIS — R60.0 BILATERAL LOWER EXTREMITY EDEMA: ICD-10-CM

## 2021-01-01 DIAGNOSIS — C18.9 COLON CANCER METASTASIZED TO MULTIPLE SITES (HCC): ICD-10-CM

## 2021-01-01 DIAGNOSIS — Z90.2 HISTORY OF LOBECTOMY OF LUNG: ICD-10-CM

## 2021-01-01 DIAGNOSIS — R10.2 PELVIC PAIN: ICD-10-CM

## 2021-01-01 DIAGNOSIS — C18.9 MALIGNANT NEOPLASM OF COLON, UNSPECIFIED PART OF COLON (HCC): ICD-10-CM

## 2021-01-01 DIAGNOSIS — E03.9 HYPOTHYROIDISM, UNSPECIFIED TYPE: ICD-10-CM

## 2021-01-01 DIAGNOSIS — R62.7 FAILURE TO THRIVE IN ADULT: ICD-10-CM

## 2021-01-01 DIAGNOSIS — F32.A DEPRESSION, UNSPECIFIED DEPRESSION TYPE: ICD-10-CM

## 2021-01-01 DIAGNOSIS — R19.09 ABDOMINAL MASS OF OTHER SITE: ICD-10-CM

## 2021-01-01 DIAGNOSIS — S00.03XS HEMATOMA OF SCALP, SEQUELA: ICD-10-CM

## 2021-01-01 DIAGNOSIS — C18.9 METASTATIC COLON CANCER IN FEMALE (HCC): ICD-10-CM

## 2021-01-01 DIAGNOSIS — S32.502A CLOSED FRACTURE OF LEFT PUBIS, UNSPECIFIED PORTION OF PUBIS, INITIAL ENCOUNTER (HCC): Primary | ICD-10-CM
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
## 2021-01-01 DIAGNOSIS — D50.9 IRON DEFICIENCY ANEMIA, UNSPECIFIED IRON DEFICIENCY ANEMIA TYPE: ICD-10-CM

## 2021-01-01 DIAGNOSIS — R05.3 PERSISTENT COUGH: ICD-10-CM

## 2021-01-01 DIAGNOSIS — K59.00 CONSTIPATION, UNSPECIFIED CONSTIPATION TYPE: ICD-10-CM

## 2021-01-01 DIAGNOSIS — R40.0 DROWSINESS: ICD-10-CM

## 2021-01-01 PROCEDURE — 93010 ELECTROCARDIOGRAM REPORT: CPT | Performed by: EMERGENCY MEDICINE

## 2021-01-01 PROCEDURE — 99309 SBSQ NF CARE MODERATE MDM 30: CPT | Performed by: INTERNAL MEDICINE

## 2021-01-01 PROCEDURE — 99310 SBSQ NF CARE HIGH MDM 45: CPT | Performed by: INTERNAL MEDICINE

## 2021-01-01 PROCEDURE — 96374 THER/PROPH/DIAG INJ IV PUSH: CPT

## 2021-01-01 PROCEDURE — 73502 X-RAY EXAM HIP UNI 2-3 VIEWS: CPT | Performed by: EMERGENCY MEDICINE

## 2021-01-01 PROCEDURE — 99306 1ST NF CARE HIGH MDM 50: CPT | Performed by: INTERNAL MEDICINE

## 2021-01-01 PROCEDURE — 71250 CT THORAX DX C-: CPT | Performed by: NURSE PRACTITIONER

## 2021-01-01 PROCEDURE — 99285 EMERGENCY DEPT VISIT HI MDM: CPT

## 2021-01-01 PROCEDURE — 93005 ELECTROCARDIOGRAM TRACING: CPT

## 2021-01-01 PROCEDURE — 72192 CT PELVIS W/O DYE: CPT | Performed by: EMERGENCY MEDICINE

## 2021-01-01 PROCEDURE — 70450 CT HEAD/BRAIN W/O DYE: CPT | Performed by: EMERGENCY MEDICINE

## 2021-01-01 PROCEDURE — 99308 SBSQ NF CARE LOW MDM 20: CPT | Performed by: INTERNAL MEDICINE

## 2021-01-01 RX ORDER — ACETAMINOPHEN 500 MG
1000 TABLET ORAL ONCE
Status: COMPLETED | OUTPATIENT
Start: 2021-01-01 | End: 2021-01-01

## 2021-01-01 RX ORDER — ACETAMINOPHEN 500 MG
1000 TABLET ORAL ONCE
Status: DISCONTINUED | OUTPATIENT
Start: 2021-01-01 | End: 2021-01-01

## 2021-01-01 RX ORDER — MORPHINE SULFATE 4 MG/ML
4 INJECTION, SOLUTION INTRAMUSCULAR; INTRAVENOUS ONCE
Status: COMPLETED | OUTPATIENT
Start: 2021-01-01 | End: 2021-01-01

## 2021-01-01 RX ORDER — HYDROCODONE BITARTRATE AND ACETAMINOPHEN 5; 325 MG/1; MG/1
1-2 TABLET ORAL EVERY 6 HOURS PRN
Qty: 10 TABLET | Refills: 0 | Status: SHIPPED | OUTPATIENT
Start: 2021-01-01 | End: 2021-01-01

## 2021-04-27 NOTE — ED INITIAL ASSESSMENT (HPI)
Left sided hip pain 2ndary to mechanical fall from standing. No shortening or rotation noted. Positive head injury, denies LOC. Not on blood thinners.

## 2021-04-27 NOTE — ED PROVIDER NOTES
Patient Seen in: [facility] AND [facility] Emergency Department      History   Patient presents with:  Trauma    Stated Complaint: fall    HPI/Subjective:   HPI  Patient is a 80-year-old female history of hyperlipidemia, osteopenia presenting from home for [facility] fall  Other systems are as noted in HPI. Constitutional and vital signs reviewed. All other systems reviewed and negative except as noted above.     Physical Exam     ED Triage Vitals [04/27/21 1634]   /63   Pulse 88   Resp 16   Temp 97.9 °F (36 at baseline.    Psychiatric:         Mood and Affect: Mood normal.             ED Course     Labs Reviewed   RAINBOW DRAW BLUE   RAINBOW DRAW LAVENDER   RAINBOW DRAW LIGHT GREEN   RAINBOW DRAW GOLD   RAPID SARS-COV-2 BY PCR     CT BRAIN OR HEAD (24416)    R bear weight. CT pelvis ordered. Discussed with family at bedside who agrees best plan for the patient is to be placed in rehab facility. Case management aware. Dispo is pending at this time.    Signed out to oncoming medical team.

## 2021-04-28 NOTE — TELEPHONE ENCOUNTER
Daughter reports patient was taken to ED yesterday after a fall and was confirmed to have a hip fracture, she was transferred to [facility] for rehab.  Daughter reports is unsatisfied with the nursing care they are providing, reports they are not giving kvng

## 2021-04-28 NOTE — CM/SW NOTE
[facility] called for BLS to take the pt to [facility]. ETA: 2030    PCS completed. Nurse to nurse report to [phone]    Requesting that the script for pain medication to be sent with the pt to [place].  Dr Yahir Shoemaker notified of the

## 2021-05-02 NOTE — PROGRESS NOTES
Location [facility]  Type in person  Date of service April 28, 2021    Admission note  80-year-old lady with a past medical history of dyslipidemia, vitamin D deficiency, hypothyroidism, anemia, restless leg syndrome was brought into the kailyn no symptoms of depression or anxiety  ENDOCRINE: denies excessive thirst    PHYSICAL EXAM:  Vitals reviewed and stable  GENERAL HEALTH:   resting comfortably  EYES:  EOMI, sclera anicteric, conjunctiva normal  HENT: normocephalic.   NECK: supple, no carotid

## 2021-05-03 NOTE — PROGRESS NOTES
Location [facility]  Type in person  Date of service 5/2/2021    Patient was seen and examined. She reports to me that she is not happy with the facility because of the cleanliness and the quality issues.   She denies any chest pain or shortn needed. History of fall-discussed fall prevention    Nondisplaced fracture of the left superior and inferior pubic rami-pain is better controlled. However pelvic movements such as sitting up from the bed, moving out of the bed is painful.     Scalp hema

## 2021-05-06 NOTE — PROGRESS NOTES
Location [facility]  Type in person  Date of service 5/5/2021    Overall she feels better emotionally. Even though she is not completely satisfied, she reports that staff has been nice to her. She does have pain with hip/pelvic movements. worried about her prognosis. There is a three-point drop of hemoglobin from November. She does have iron deficiency anemia. In light of her abdominal mass I am worried about malignancy. I will talk to family again regarding this.   Check TSH and CBC nex

## 2021-05-09 NOTE — PROGRESS NOTES
Location [facility]  Type in person  Date of service 5/9/2021    She reports that she  has cough with some congestion for the last 2 days. Expectoration is yellowish in color. She denies any chest pain. She denies any fever or chills.   Her colonic/cecal mass -as above    History of breast cancer    Hypothyroidism on supplementation    Restless leg syndrome-stable    GERD-stable continue supplementation      Plan  We will start azithromycin  Get chest x-ray. Start Breo and  inhaler.   D

## 2021-05-09 NOTE — PROGRESS NOTES
Location [facility]  Type in person  Date of service 5/7/2021     Patient seen and examined. Overall she is doing okay. She is okay with her pain. She still looks depressed as she does not want to be here and she wants to go home.  She denies importance of blood transfusion with the patient. She is partially agreeable for blood transfusion if her hemoglobin is less than 7. We will continue the iron supplementation. Pain is reasonably well controlled. Continue with the PT OT.

## 2021-05-12 NOTE — PROGRESS NOTES
Location [facility]  Type in person  Date of service 5/12/2021     Her cough has been almost resolved. She has no fever or chills. She is much more awake, and talkative. She continues to have pain in the pelvic area with activity/mobility. supplementation        Plan  Stop Xyzal complete antibiotics and continue Breo  Pain medication optimized to avoid drowsiness. Increase activity as tolerated.   Work-up of abdominal mass as outpatient

## 2021-05-18 NOTE — PROGRESS NOTES
Location [facility]  Type in person  Date of service 5/17/2021     I saw her today morning. She was sitting in the chair and eating breakfast.  She is in a better mood. She reports that her pain is better.   However her cough started to both superior and inferior pubic rami-pain is better controlled now. .     Scalp hematoma-stable     Abdominal CT scan of the pelvis at [facility]-suspicious for colonic/cecal mass -as above     History of breast cancer     Hypothyroidism on supplementation     Restles

## 2021-05-31 NOTE — PROGRESS NOTES
Location [facility]  Type in person  Date of service 5/27/2021    Patient was seen and examined. She was lying in the bed. She continues to have fatigue and poor appetite. I am very much concerned about her prognosis.   I am worried about m concerning for malignancy -I have discussed with the patient regarding this on May 2, 2021 she does not want to do any work-up for this.   I have informed daughter also regarding this.     Vitamin D deficiency-vitamin D is 19.6 continue vitamin D supplement

## 2021-05-31 NOTE — PROGRESS NOTES
Location [facility]  Type in person  Date of service 5/24/2021    RN called me stating that patient has lower extremity edema. When I examined her, she only has trace edema. She reports that her edema was worse last night.   She continues to prevention     Nondisplaced fracture of the left superior and inferior pubic rami-pain is better controlled now. .     Scalp hematoma-stable     Abdominal CT scan of the pelvis at [facility]-suspicious for colonic/cecal mass -as above     History of breast cancer

## 2021-05-31 NOTE — PROGRESS NOTES
Location [facility]  Type in person  Date of service 5/20/2021     She reports that some days are good and some days are bad. Her cough is worse at night.   She denies any chest pain or shortness of breath at rest.  She continues to feel fati cancer     Restless leg syndrome-stable     GERD-stable continue supplementation        Plan  Continue tapering prednisone.   Can try Xyzal/Tessalon Perles  Continue bronchodilators and incentive spirometry  Check TSH  Optimize pain control  Increase activi

## 2021-06-10 NOTE — PROGRESS NOTES
[facility]  Type telemedicine visit with nurse practitioner at bedside  Date of service 6/1//2021     Patient was seen and evaluated through audio and video. Patient reports that she is feeling very tired and fatigued.   Her cough is

## 2021-06-10 NOTE — PROGRESS NOTES
Location [facility]  Type in person  Date of service 6/8//2021     Her appetite has been poor. She denies any pain. She reports that she is very tired. She is not eating much. She asked me about CT scan report.   I have informed her luis antonio supplementation        Plan  I have discussed with the patient regarding abnormal CT scan findings. I have discussed with the patient's daughter Karyn Lora who is an RN regarding the CT scan findings. Patient wants to do hospice care.   Karyn Lora also [facility]

## 2021-06-11 NOTE — PROGRESS NOTES
Location [facility]  Type in person  Date of service 6/11//2021     Clinically she is doing okay. She is eating reasonably okay. I have encouraged her to eat multiple meals a day. She denies any pain.   She denies any chest pain or shortnes hospice care. CODE STATUS DNR  I have informed her to try to eat small meals multiple times a day.   Overall her prognosis is guarded

## 2021-06-20 NOTE — PROGRESS NOTES
Location [facility]  Type in person  Date of service 6/14//2021     she was eating BF. no pain   no cough, still feels tired   Looking forward to leaving rehab   she was very thankful for our service         PHYSICAL EXAM:  Vitals reviewed an

## (undated) NOTE — LETTER
07/15/19        Robina De La Rosa  [address]      Dear Adia Parrish,    [id number] records indicate that you have outstanding lab work and or testing that was ordered for you and has not yet been completed:  Orders Placed This Encounter

## (undated) NOTE — MR AVS SNAPSHOT
[facility] [place] [address] [address] [id number] [phone]               Thank you for choosing us for your health care visit with Misti Rivas MD.  We are glad to serve you and happy to provide you with this summary of Imaging:  [facility] SASHA 2D+3D DIAGNOSTIC DONNA HYDE (PMV=[id number])    Instructions:   To schedule a test at any [facility], call Central Scheduling at [phone], Monday through Friday between 7:30am to 6pm and on Saturday bet Amitriptyline HCl 10 MG Tabs   Take 1 tablet (10 mg total) by mouth nightly.    Commonly known as:  ELAVIL           Halcinonide 0.1 % Crea   Use qd for back   Commonly known as:  HALOG           Ketoconazole 2 % Sham   Apply to scalp 2 times per

## (undated) NOTE — LETTER
July 15, 2020     Cassie Mayers  [address] [place] [address]      Dear Daine Epley:    Below are the results from your recent visit:    LIPID PANEL   LDL IS ELEVATED    BAD CHOLESTEROL   LOW CHOLESTEROL DIET ADVISED   AVOID SATURATED AND TRANS FAT

## (undated) NOTE — MR AVS SNAPSHOT
After Visit Summary   7/9/2020    Robina De La Rosa    MRN: [id number]           Visit Information     Date & Time  7/9/2020 10:20 AM Provider  Pedro Bruce MD [address] [place] [address] Dept.  Phone  [phone] y URINE MICROSCOPIC W REFLEX CULTURE [[id number] CUSTOM]  7/9/2020 7/9/2021                [facility] now offers Video Visits through [address] for adult and pediatric patients.   Video Visits are available Monday - Friday for many common conditions such as allergies, c Monday – Friday  10:00 am – 10:00 pm   Saturday – Sunday  10:00 am – 4:00 pm     [facility] Groupe   Monday – Friday  4:00 pm – 10:00 pm   Saturday – Sunday  10:00 am – 4:00 pm  W[facility]IN [facility]  Emergency Medicine Providers  Conditions needing urgent attention, but